# Patient Record
Sex: MALE | Race: WHITE | HISPANIC OR LATINO | Employment: FULL TIME | URBAN - METROPOLITAN AREA
[De-identification: names, ages, dates, MRNs, and addresses within clinical notes are randomized per-mention and may not be internally consistent; named-entity substitution may affect disease eponyms.]

---

## 2017-01-05 ENCOUNTER — HOSPITAL ENCOUNTER (EMERGENCY)
Facility: HOSPITAL | Age: 22
Discharge: HOME | End: 2017-01-05
Attending: EMERGENCY MEDICINE | Admitting: EMERGENCY MEDICINE
Payer: COMMERCIAL

## 2017-01-05 VITALS
HEART RATE: 74 BPM | TEMPERATURE: 98.6 F | SYSTOLIC BLOOD PRESSURE: 141 MMHG | DIASTOLIC BLOOD PRESSURE: 66 MMHG | RESPIRATION RATE: 18 BRPM | OXYGEN SATURATION: 100 % | WEIGHT: 185 LBS

## 2017-01-05 DIAGNOSIS — T14.8XXA MUSCLE STRAIN: ICD-10-CM

## 2017-01-05 DIAGNOSIS — R10.9 FLANK PAIN: Primary | ICD-10-CM

## 2017-01-05 LAB
BILIRUB UR QL STRIP: NEGATIVE
CLARITY UR: CLEAR
CLARITY, POC: CLEAR
COLOR UR: YELLOW
COLOR, POC: YELLOW
GLUCOSE UR STRIP-MCNC: NEGATIVE MG/DL
HGB UR QL STRIP.AUTO: NEGATIVE
KETONES UR STRIP-MCNC: NEGATIVE MG/DL
LEUKOCYTE ESTERASE UR QL STRIP: NEGATIVE
NITRITE UR QL STRIP: NEGATIVE
PH UR STRIP.AUTO: 7 [PH] (ref 4.5–8)
PROT UR STRIP-MCNC: NEGATIVE MG/DL
SP GR UR STRIP.AUTO: 1.02 (ref 1–1.03)
UROBILINOGEN UR QL STRIP.AUTO: 0.2 E.U./DL

## 2017-01-05 PROCEDURE — 99284 EMERGENCY DEPT VISIT MOD MDM: CPT

## 2017-01-05 PROCEDURE — 81002 URINALYSIS NONAUTO W/O SCOPE: CPT | Performed by: EMERGENCY MEDICINE

## 2017-01-05 PROCEDURE — 81003 URINALYSIS AUTO W/O SCOPE: CPT

## 2017-01-05 RX ORDER — LIDOCAINE 50 MG/G
1 PATCH TOPICAL EVERY 24 HOURS
Qty: 30 PATCH | Refills: 0 | Status: SHIPPED | OUTPATIENT
Start: 2017-01-05 | End: 2018-06-20

## 2017-01-05 RX ORDER — TIZANIDINE 4 MG/1
4 TABLET ORAL EVERY 8 HOURS PRN
Status: DISCONTINUED | OUTPATIENT
Start: 2017-01-05 | End: 2017-01-05 | Stop reason: HOSPADM

## 2017-01-05 RX ORDER — TIZANIDINE HYDROCHLORIDE 4 MG/1
4 CAPSULE, GELATIN COATED ORAL 3 TIMES DAILY
Qty: 90 CAPSULE | Refills: 0 | Status: SHIPPED | OUTPATIENT
Start: 2017-01-05 | End: 2018-06-20

## 2017-01-05 RX ORDER — LIDOCAINE 50 MG/G
1 PATCH TOPICAL ONCE
Status: COMPLETED | OUTPATIENT
Start: 2017-01-05 | End: 2017-01-05

## 2017-01-05 RX ORDER — ACETAMINOPHEN 325 MG/1
650 TABLET ORAL ONCE
Status: COMPLETED | OUTPATIENT
Start: 2017-01-05 | End: 2017-01-05

## 2017-01-05 RX ADMIN — LIDOCAINE 1 PATCH: 50 PATCH CUTANEOUS at 18:50

## 2017-01-05 RX ADMIN — ACETAMINOPHEN 650 MG: 325 TABLET, FILM COATED ORAL at 18:49

## 2017-04-02 ENCOUNTER — HOSPITAL ENCOUNTER (EMERGENCY)
Facility: HOSPITAL | Age: 22
Discharge: HOME | End: 2017-04-03
Attending: EMERGENCY MEDICINE | Admitting: EMERGENCY MEDICINE
Payer: COMMERCIAL

## 2017-04-02 VITALS
HEART RATE: 76 BPM | WEIGHT: 175 LBS | OXYGEN SATURATION: 96 % | DIASTOLIC BLOOD PRESSURE: 63 MMHG | SYSTOLIC BLOOD PRESSURE: 131 MMHG | RESPIRATION RATE: 18 BRPM | TEMPERATURE: 98.1 F

## 2017-04-02 DIAGNOSIS — J06.9 VIRAL URI WITH COUGH: Primary | ICD-10-CM

## 2017-04-03 PROCEDURE — 99283 EMERGENCY DEPT VISIT LOW MDM: CPT

## 2018-02-20 ENCOUNTER — HOSPITAL ENCOUNTER (EMERGENCY)
Facility: HOSPITAL | Age: 23
Discharge: HOME | End: 2018-02-20
Attending: EMERGENCY MEDICINE | Admitting: EMERGENCY MEDICINE
Payer: COMMERCIAL

## 2018-02-20 VITALS
DIASTOLIC BLOOD PRESSURE: 64 MMHG | RESPIRATION RATE: 16 BRPM | WEIGHT: 170 LBS | TEMPERATURE: 98.8 F | BODY MASS INDEX: 23.71 KG/M2 | HEART RATE: 68 BPM | SYSTOLIC BLOOD PRESSURE: 123 MMHG | OXYGEN SATURATION: 98 %

## 2018-02-20 DIAGNOSIS — R19.7 NAUSEA VOMITING AND DIARRHEA: Primary | ICD-10-CM

## 2018-02-20 DIAGNOSIS — R11.2 NAUSEA VOMITING AND DIARRHEA: Primary | ICD-10-CM

## 2018-02-20 LAB
ALBUMIN SERPL BCP-MCNC: 4.8 G/DL (ref 3.5–5)
ALP SERPL-CCNC: 73 U/L (ref 46–116)
ALT SERPL W P-5'-P-CCNC: 19 U/L (ref 12–78)
ANION GAP SERPL CALCULATED.3IONS-SCNC: 13 MMOL/L (ref 4–13)
AST SERPL W P-5'-P-CCNC: 16 U/L (ref 5–45)
BACTERIA UR QL AUTO: NORMAL /HPF
BASOPHILS # BLD AUTO: 0.03 THOUSANDS/ΜL (ref 0–0.1)
BASOPHILS NFR BLD AUTO: 0 % (ref 0–1)
BILIRUB SERPL-MCNC: 0.71 MG/DL (ref 0.2–1)
BILIRUB UR QL STRIP: ABNORMAL
BUN SERPL-MCNC: 12 MG/DL (ref 5–25)
CALCIUM SERPL-MCNC: 10.2 MG/DL (ref 8.3–10.1)
CHLORIDE SERPL-SCNC: 104 MMOL/L (ref 100–108)
CLARITY UR: CLEAR
CO2 SERPL-SCNC: 26 MMOL/L (ref 21–32)
COLOR UR: ABNORMAL
COLOR, POC: NORMAL
CREAT SERPL-MCNC: 1.15 MG/DL (ref 0.6–1.3)
EOSINOPHIL # BLD AUTO: 0.02 THOUSAND/ΜL (ref 0–0.61)
EOSINOPHIL NFR BLD AUTO: 0 % (ref 0–6)
ERYTHROCYTE [DISTWIDTH] IN BLOOD BY AUTOMATED COUNT: 12.5 % (ref 11.6–15.1)
GFR SERPL CREATININE-BSD FRML MDRD: 88 ML/MIN/1.73SQ M
GLUCOSE SERPL-MCNC: 99 MG/DL (ref 65–140)
GLUCOSE UR STRIP-MCNC: NEGATIVE MG/DL
HCT VFR BLD AUTO: 46.2 % (ref 36.5–49.3)
HGB BLD-MCNC: 16.6 G/DL (ref 12–17)
HGB UR QL STRIP.AUTO: NEGATIVE
KETONES UR STRIP-MCNC: ABNORMAL MG/DL
LEUKOCYTE ESTERASE UR QL STRIP: NEGATIVE
LIPASE SERPL-CCNC: 90 U/L (ref 73–393)
LYMPHOCYTES # BLD AUTO: 0.9 THOUSANDS/ΜL (ref 0.6–4.47)
LYMPHOCYTES NFR BLD AUTO: 6 % (ref 14–44)
MCH RBC QN AUTO: 29.8 PG (ref 26.8–34.3)
MCHC RBC AUTO-ENTMCNC: 35.9 G/DL (ref 31.4–37.4)
MCV RBC AUTO: 83 FL (ref 82–98)
MONOCYTES # BLD AUTO: 0.76 THOUSAND/ΜL (ref 0.17–1.22)
MONOCYTES NFR BLD AUTO: 5 % (ref 4–12)
MUCOUS THREADS UR QL AUTO: NORMAL
NEUTROPHILS # BLD AUTO: 12.69 THOUSANDS/ΜL (ref 1.85–7.62)
NEUTS SEG NFR BLD AUTO: 89 % (ref 43–75)
NITRITE UR QL STRIP: NEGATIVE
NON-SQ EPI CELLS URNS QL MICRO: NORMAL /HPF
NRBC BLD AUTO-RTO: 0 /100 WBCS
PH UR STRIP.AUTO: 6 [PH] (ref 4.5–8)
PLATELET # BLD AUTO: 265 THOUSANDS/UL (ref 149–390)
PMV BLD AUTO: 10.3 FL (ref 8.9–12.7)
POTASSIUM SERPL-SCNC: 4 MMOL/L (ref 3.5–5.3)
PROT SERPL-MCNC: 8.7 G/DL (ref 6.4–8.2)
PROT UR STRIP-MCNC: ABNORMAL MG/DL
RBC # BLD AUTO: 5.57 MILLION/UL (ref 3.88–5.62)
RBC #/AREA URNS AUTO: NORMAL /HPF
SODIUM SERPL-SCNC: 143 MMOL/L (ref 136–145)
SP GR UR STRIP.AUTO: 1.02 (ref 1–1.03)
UROBILINOGEN UR QL STRIP.AUTO: 0.2 E.U./DL
WBC # BLD AUTO: 14.4 THOUSAND/UL (ref 4.31–10.16)
WBC #/AREA URNS AUTO: NORMAL /HPF

## 2018-02-20 PROCEDURE — 96374 THER/PROPH/DIAG INJ IV PUSH: CPT

## 2018-02-20 PROCEDURE — 83690 ASSAY OF LIPASE: CPT | Performed by: PHYSICIAN ASSISTANT

## 2018-02-20 PROCEDURE — 81001 URINALYSIS AUTO W/SCOPE: CPT

## 2018-02-20 PROCEDURE — 81002 URINALYSIS NONAUTO W/O SCOPE: CPT | Performed by: PHYSICIAN ASSISTANT

## 2018-02-20 PROCEDURE — 99283 EMERGENCY DEPT VISIT LOW MDM: CPT

## 2018-02-20 PROCEDURE — 96361 HYDRATE IV INFUSION ADD-ON: CPT

## 2018-02-20 PROCEDURE — 36415 COLL VENOUS BLD VENIPUNCTURE: CPT | Performed by: PHYSICIAN ASSISTANT

## 2018-02-20 PROCEDURE — 80053 COMPREHEN METABOLIC PANEL: CPT | Performed by: PHYSICIAN ASSISTANT

## 2018-02-20 PROCEDURE — 85025 COMPLETE CBC W/AUTO DIFF WBC: CPT | Performed by: PHYSICIAN ASSISTANT

## 2018-02-20 RX ORDER — DICYCLOMINE HCL 20 MG
20 TABLET ORAL ONCE
Status: COMPLETED | OUTPATIENT
Start: 2018-02-20 | End: 2018-02-20

## 2018-02-20 RX ORDER — ONDANSETRON 4 MG/1
4 TABLET, FILM COATED ORAL EVERY 8 HOURS PRN
Qty: 15 TABLET | Refills: 0 | Status: SHIPPED | OUTPATIENT
Start: 2018-02-20 | End: 2018-06-20

## 2018-02-20 RX ORDER — ONDANSETRON 2 MG/ML
4 INJECTION INTRAMUSCULAR; INTRAVENOUS ONCE
Status: COMPLETED | OUTPATIENT
Start: 2018-02-20 | End: 2018-02-20

## 2018-02-20 RX ORDER — DICYCLOMINE HCL 20 MG
20 TABLET ORAL EVERY 6 HOURS
Qty: 20 TABLET | Refills: 0 | Status: SHIPPED | OUTPATIENT
Start: 2018-02-20 | End: 2018-06-20

## 2018-02-20 RX ADMIN — ONDANSETRON 4 MG: 2 INJECTION INTRAMUSCULAR; INTRAVENOUS at 14:21

## 2018-02-20 RX ADMIN — DICYCLOMINE HYDROCHLORIDE 20 MG: 20 TABLET ORAL at 14:21

## 2018-02-20 RX ADMIN — SODIUM CHLORIDE 1000 ML: 0.9 INJECTION, SOLUTION INTRAVENOUS at 14:20

## 2018-02-20 NOTE — ED PROVIDER NOTES
History  Chief Complaint   Patient presents with    Vomiting     Vomiting and diarrhea since yesterday  19 yo male with pmh of asthma and seizures, presents for evaluation of nausea, vomiting, diarrhea and generalized upper abdominal pain for the past 2 days  Associated sxs include decreased appetite and chills  Is around his sick contacts, his brother with similar symptoms  No recent travel history  No new foods  Tried nothing for it  Denies fever, cough, nasal congestion, body aches, ear pain, cp, sob, difficulty breathing, constipation, hematochezia or melena  Prior to Admission Medications   Prescriptions Last Dose Informant Patient Reported? Taking? Phenytoin (DILANTIN PO)   Yes No   Sig: Take by mouth daily   TiZANidine (ZANAFLEX) 4 MG capsule   No No   Sig: Take 1 capsule by mouth 3 (three) times a day for 30 days   lidocaine (LIDODERM) 5 %   No No   Sig: Place 1 patch on the skin every 24 hours for 30 days Remove & Discard patch within 12 hours or as directed by MD      Facility-Administered Medications: None       Past Medical History:   Diagnosis Date    Asthma     Seizures ([facility] [address] )        Past Surgical History:   Procedure Laterality Date    KIDNEY SURGERY      NEPHRECTOMY Left     Stopped functioning       History reviewed  No pertinent family history  I have reviewed and agree with the history as documented  Social History   Substance Use Topics    Smoking status: Current Every Day Smoker     Packs/day: 0 20    Smokeless tobacco: Never Used    Alcohol use Yes      Comment: ocass  Review of Systems  Negative :  Chills, fever, congestion, sore throat, cough, dysuria, flank pain, frequency, urgency, myalgias  Negative for skin     GI:  Positive for abdominal pain, diarrhea, nausea, vomiting      Physical Exam  ED Triage Vitals [02/20/18 1218]   Temperature Pulse Respirations Blood Pressure SpO2   98 8 °F (37 1 °C) 68 16 120/60 100 %      Temp Source Heart Rate Source Patient Position - Orthostatic VS BP Location FiO2 (%)   Oral Monitor Lying Right arm --      Pain Score       5           Orthostatic Vital Signs  Vitals:    02/20/18 1218 02/20/18 1534   BP: 120/60 123/64   Pulse: 68 68   Patient Position - Orthostatic VS: Lying Lying       Physical Exam   Constitutional: He is oriented to person, place, and time  He appears well-developed and well-nourished  He is cooperative  No distress  HENT:   Head: Normocephalic and atraumatic  Eyes: Conjunctivae are normal    Neck: Normal range of motion  Neck supple  Cardiovascular: Normal rate and normal heart sounds  No murmur heard  Pulmonary/Chest: Effort normal and breath sounds normal    Abdominal: Soft  Normal appearance and bowel sounds are normal  There is tenderness in the periumbilical area and left upper quadrant  There is no rigidity, no rebound, no guarding, no CVA tenderness, no tenderness at McBurney's point and negative Michele's sign  Musculoskeletal: Normal range of motion  Neurological: He is alert and oriented to person, place, and time  Skin: Skin is warm  No rash noted  He is not diaphoretic  No erythema  Psychiatric: He has a normal mood and affect           ED Medications  Medications   sodium chloride 0 9 % bolus 1,000 mL (0 mL Intravenous Stopped 2/20/18 1512)   ondansetron (ZOFRAN) injection 4 mg (4 mg Intravenous Given 2/20/18 1421)   dicyclomine (BENTYL) tablet 20 mg (20 mg Oral Given 2/20/18 1421)       Diagnostic Studies  Results Reviewed     Procedure Component Value Units Date/Time    Urine Microscopic [[id number]]  (Normal) Collected:  02/20/18 1517    Lab Status:  Final result Specimen:  Urine from Urine, Clean Catch Updated:  02/20/18 1606     RBC, UA None Seen /hpf      WBC, UA None Seen /hpf      Epithelial Cells None Seen /hpf      Bacteria, UA Occasional /hpf      MUCOUS THREADS Occasional    POCT urinalysis dipstick [[id number]]  (Normal) Resulted:  02/20/18 1515    Lab Status: Final result Specimen:  Urine Updated:  02/20/18 1518     Color, UA Rochelle    ED Urine Macroscopic [[id number]]  (Abnormal) Collected:  02/20/18 1517    Lab Status:  Final result Specimen:  Urine Updated:  02/20/18 1516     Color, UA Rochelle     Clarity, UA Clear     pH, UA 6 0     Leukocytes, UA Negative     Nitrite, UA Negative     Protein,  (2+) (A) mg/dl      Glucose, UA Negative mg/dl      Ketones, UA >=160 (4+) (A) mg/dl      Urobilinogen, UA 0 2 E U /dl      Bilirubin, UA Interference- unable to analyze (A)     Blood, UA Negative     Specific Gravity, UA 1 025    Narrative:       CLINITEK RESULT    Comprehensive metabolic panel [[id number]]  (Abnormal) Collected:  02/20/18 1415    Lab Status:  Final result Specimen:  Blood from Arm, Left Updated:  02/20/18 1438     Sodium 143 mmol/L      Potassium 4 0 mmol/L      Chloride 104 mmol/L      CO2 26 mmol/L      Anion Gap 13 mmol/L      BUN 12 mg/dL      Creatinine 1 15 mg/dL      Glucose 99 mg/dL      Calcium 10 2 (H) mg/dL      AST 16 U/L      ALT 19 U/L      Alkaline Phosphatase 73 U/L      Total Protein 8 7 (H) g/dL      Albumin 4 8 g/dL      Total Bilirubin 0 71 mg/dL      eGFR 88 ml/min/1 73sq m     Narrative:         National Kidney Disease Education Program recommendations are as follows:  GFR calculation is accurate only with a steady state creatinine  Chronic Kidney disease less than 60 ml/min/1 73 sq  meters  Kidney failure less than 15 ml/min/1 73 sq  meters      Lipase [[id number]]  (Normal) Collected:  02/20/18 1415    Lab Status:  Final result Specimen:  Blood from Arm, Left Updated:  02/20/18 1438     Lipase 90 u/L     CBC and differential [[id number]]  (Abnormal) Collected:  02/20/18 1415    Lab Status:  Final result Specimen:  Blood from Arm, Left Updated:  02/20/18 1432     WBC 14 40 (H) Thousand/uL      RBC 5 57 Million/uL      Hemoglobin 16 6 g/dL      Hematocrit 46 2 %      MCV 83 fL      MCH 29 8 pg      MCHC 35 9 g/dL      RDW 12 5 %      MPV 10 3 fL      Platelets 414 Thousands/uL      nRBC 0 /100 WBCs      Neutrophils Relative 89 (H) %      Lymphocytes Relative 6 (L) %      Monocytes Relative 5 %      Eosinophils Relative 0 %      Basophils Relative 0 %      Neutrophils Absolute 12 69 (H) Thousands/µL      Lymphocytes Absolute 0 90 Thousands/µL      Monocytes Absolute 0 76 Thousand/µL      Eosinophils Absolute 0 02 Thousand/µL      Basophils Absolute 0 03 Thousands/µL                  No orders to display              Procedures  Procedures       Phone Contacts  ED Phone Contact    ED Course  ED Course                                MDM  Number of Diagnoses or Management Options  Nausea vomiting and diarrhea:   Diagnosis management comments: Well-appearing 57-year-old male presents for evaluation nausea, vomiting as well as diarrhea for the past 2 days  Patient is well-appearing, vital signs not concerning  After doses Zofran and Bentyl, reports improvement in symptoms  Will advise to continue to follow up with family care provider  Return precautions given if symptoms worsen  Patient demonstrates understanding and agrees to plan  Differential diagnosis includes was not limited to, gastroenteritis, food poisoning, IBS, other    CritCare Time    Disposition  Final diagnoses:   Nausea vomiting and diarrhea     Time reflects when diagnosis was documented in both MDM as applicable and the Disposition within this note     Time User Action Codes Description Comment    2/20/2018  3:27 PM Amrit Pavon Add [R11 2,  R19 7] Nausea vomiting and diarrhea       ED Disposition     ED Disposition Condition Comment    Discharge  [facility] discharge to home/self care  Condition at discharge: Good        Follow-up Information     Follow up With Specialties Details Why Contact Info Additional Amparo Mattson MD [facility] Medicine Schedule an appointment as soon as possible for a visit in 3 days Follow up for recheck of symptoms    [facility] [facility] Emergency Department Emergency Medicine  If symptoms worsen, such as if abdominal pain localizes over the right lower abdomen  [address] [facility], [address], [place], [address]        Discharge Medication List as of 2/20/2018  3:29 PM      START taking these medications    Details   dicyclomine (BENTYL) 20 mg tablet Take 1 tablet (20 mg total) by mouth every 6 (six) hours for 5 days, Starting Tue 2/20/2018, Until Sun 2/25/2018, Print      ondansetron (ZOFRAN) 4 mg tablet Take 1 tablet (4 mg total) by mouth every 8 (eight) hours as needed for nausea or vomiting for up to 5 days, Starting Tue 2/20/2018, Until Sun 2/25/2018, Print         CONTINUE these medications which have NOT CHANGED    Details   lidocaine (LIDODERM) 5 % Place 1 patch on the skin every 24 hours for 30 days Remove & Discard patch within 12 hours or as directed by MD, Starting 1/5/2017, Until Sat 2/4/17, Print      Phenytoin (DILANTIN PO) Take by mouth daily, Until Discontinued, Historical Med      TiZANidine (ZANAFLEX) 4 MG capsule Take 1 capsule by mouth 3 (three) times a day for 30 days, Starting 1/5/2017, Until Sat 2/4/17, Print           No discharge procedures on file      ED Provider  Electronically Signed by           Lesley Luna PA-C  02/24/18 5562

## 2018-02-20 NOTE — DISCHARGE INSTRUCTIONS
- Rest, drink plenty of fluids  Acute Nausea and Vomiting, Ambulatory Care   GENERAL INFORMATION:   Acute nausea and vomiting  starts suddenly, gets worse quickly, and lasts a short time  Nausea and vomiting may be caused by pregnancy, alcohol, infection, or medicines  Common related symptoms include the following:   · Fever    · Abdominal swelling    · Pain, tenderness, or a lump in the abdomen    · Splashing sounds heard in your stomach when you move  Seek immediate care for the following symptoms:   · Blood in your vomit or bowel movements    · Sudden, severe pain in your chest and upper abdomen after hard vomiting    · Dizziness, dry mouth, and thirst    · Urinating very little or not at all    · Muscle weakness, leg cramps, and trouble breathing    · A heart beat that is faster than normal    · Vomiting for more than 48 hours  Treatment for acute nausea and vomiting  may include medicines to calm your stomach and stop the vomiting  You may need IV fluids if you are dehydrated  Manage your nausea and vomiting:   · Drink liquids as directed to prevent dehydration  Ask how much liquid to drink each day and which liquids are best for you  You may need to drink an oral rehydration solution (ORS)  ORS contains water, salts, and sugar that are needed to replace the lost body fluids  Ask what kind of ORS to use, how much to drink, and where to get it  · Eat smaller meals, more often  Eat small amounts of food every 2 to 3 hours, even if you are not hungry  Food in your stomach may help decrease your nausea  · Avoid stress  Find ways to relax and manage your stress  Headaches due to stress may cause nausea and vomiting  Get more rest and sleep  Follow up with your healthcare provider as directed:  Write down your questions so you remember to ask them during your visits  CARE AGREEMENT:   You have the right to help plan your care  Learn about your health condition and how it may be treated   Discuss treatment options with your caregivers to decide what care you want to receive  You always have the right to refuse treatment  The above information is an  only  It is not intended as medical advice for individual conditions or treatments  Talk to your doctor, nurse or pharmacist before following any medical regimen to see if it is safe and effective for you  © 2014 [address] is for End User's use only and may not be sold, redistributed or otherwise used for commercial purposes  All illustrations and images included in CareNotes® are the copyrighted property of [employer] , [facility]  or Carlin Yuen  Acute Diarrhea   WHAT YOU NEED TO KNOW:   What is acute diarrhea? Acute diarrhea starts quickly and lasts a short time, usually 1 to 3 days  It can last up to 2 weeks  What causes acute diarrhea? · Bacteria, such as E coli or salmonella    · Viruses, such as rotavirus and norovirus    · A parasite, such as giardia    · Medicines, such as laxatives, antacids, or antibiotics    · An allergy to lactose, soy, or gluten    · Eating food or drinking water that contains germs    · Medical treatments, such as chemotherapy or radiation  What other signs and symptoms might I have with acute diarrhea? You may have 3 or more episodes of diarrhea  It may be hard to control your diarrhea  You may also have any of the following:  · Fever and chills    · Headache or abdominal pain    · Nausea and vomiting    · Symptoms of dehydration such as thirst, decreased urination, dry skin, sunken eyes, or fast, pounding heartbeat  What does my healthcare provider need to know about my acute diarrhea? Your healthcare provider will ask about your symptoms  He or she will ask what you have recently eaten and if you have traveled to other countries  Tell the provider what medicines you use or if you have been around anyone who is sick   Your healthcare provider may check you for signs of dehydration  How is acute diarrhea treated? Acute diarrhea usually gets better without treatment  You may need any of the following if your diarrhea is severe or lasts longer than a few days:  · Diarrhea medicine  is an over-the-counter medicine that helps slow or stop your diarrhea  · Antibiotics  may be given to help treat an infection caused by bacteria  · Parasite medicine  may be given to treat an infection caused by parasites  How can acute diarrhea be managed? · Drink liquids as directed  Liquids will help prevent dehydration caused by diarrhea  Ask your healthcare provider how much liquid to drink each day and which liquids are best for you  You may need to drink an oral rehydration solution (ORS)  An ORS has the right amounts of water, salts, and sugar you need to replace body fluids  You can buy an ORS at most grocery stores and pharmacies  · Eat foods that are easy to digest   Examples include rice, lentils, cereal, bananas, potatoes, and bread  It also includes some fruits (bananas, melon), well-cooked vegetables, and lean meats  Avoid foods high in fiber, fat, and sugar  Also avoid caffeine, alcohol, dairy, and red meat until your diarrhea is gone  How can acute diarrhea be prevented? · Wash your hands often  Use soap and water  Wash your hands before you eat or prepare food  Also wash your hands after you use the bathroom  Use an alcohol-based hand gel when soap and water are not available  · Keep bathroom surfaces clean  This helps prevent the spread of germs that cause acute diarrhea  · Wash fruits and vegetables well before you eat them  This can help remove germs that cause diarrhea  If possible, remove the skin from fruits and vegetables, or cook them well before you eat them  · Cook meat as directed  ¨ Cook ground meat  to 160°F      ¨ Cook ground poultry, whole poultry, or cuts of poultry  to at least 165°F  Remove the meat from heat   Let it stand for 3 minutes before you eat it  ¨ Cook whole cuts of meat other than poultry  to at least 145°F  Remove the meat from heat  Let it stand for 3 minutes before you eat it  · Wash dishes that have touched raw meat with hot water and soap  This includes cutting boards, utensils, dishes, and serving containers  · Place raw or cooked meat in the refrigerator as soon as possible  Bacteria can grow in meat that is left at room temperature too long  · Do not eat raw or undercooked oysters, clams, or mussels  These foods may be contaminated and cause infection  · Drink filtered or treated water only when you travel  Do not put ice in your drinks  Drink bottled water whenever possible  When should I seek immediate care? · You feel confused  · Your heartbeat is faster than normal      · Your eyes look deeply sunken, or you have no tears when you cry  · You urinate less than usual, or your urine is dark yellow  · You have blood or mucus in your stools  · You have severe abdominal pain  · You are unable to drink any liquids  When should I contact my healthcare provider? · Your symptoms do not get better with treatment  · You have a fever higher than 101 3°F (38 5°C)  · You have trouble eating and drinking because you are vomiting  · You are thirsty or have a dry mouth  · Your diarrhea does not get better in 7 days  · You have questions or concerns about your condition or care  CARE AGREEMENT:   You have the right to help plan your care  Learn about your health condition and how it may be treated  Discuss treatment options with your caregivers to decide what care you want to receive  You always have the right to refuse treatment  The above information is an  only  It is not intended as medical advice for individual conditions or treatments   Talk to your doctor, nurse or pharmacist before following any medical regimen to see if it is safe and effective for you  © 2017 [address] [facility] Information is for End User's use only and may not be sold, redistributed or otherwise used for commercial purposes  All illustrations and images included in CareNotes® are the copyrighted property of [employer]  or Carlin Yuen

## 2018-06-09 LAB
ABSOL LYMPHOCYTES (HISTORICAL): 2.5 K/UL (ref 0.5–4)
ALBUMIN SERPL BCP-MCNC: 4.4 G/DL (ref 3–5.2)
ALP SERPL-CCNC: 52 U/L (ref 43–122)
ALT SERPL W P-5'-P-CCNC: 25 U/L (ref 9–52)
ANION GAP SERPL CALCULATED.3IONS-SCNC: 12 MMOL/L (ref 5–14)
AST SERPL W P-5'-P-CCNC: 24 U/L (ref 17–59)
BASOPHILS # BLD AUTO: 0.1 K/UL (ref 0–0.1)
BASOPHILS # BLD AUTO: 1 % (ref 0–1)
BILIRUB SERPL-MCNC: 0.3 MG/DL
BUN SERPL-MCNC: 12 MG/DL (ref 5–25)
CALCIUM SERPL-MCNC: 9.8 MG/DL (ref 8.4–10.2)
CHLORIDE SERPL-SCNC: 103 MEQ/L (ref 97–108)
CK SERPL-CCNC: 111 U/L (ref 55–170)
CO2 SERPL-SCNC: 26 MMOL/L (ref 22–30)
CREATINE, SERUM (HISTORICAL): 0.83 MG/DL (ref 0.7–1.5)
DEPRECATED RDW RBC AUTO: 13.3 %
EGFR (HISTORICAL): >60 ML/MIN/1.73 M2
EOSINOPHIL # BLD AUTO: 0.3 K/UL (ref 0–0.4)
EOSINOPHIL NFR BLD AUTO: 5 % (ref 0–6)
GLUCOSE SERPL-MCNC: 84 MG/DL (ref 70–99)
GLUCOSE SERPL-MCNC: 96 MG/DL (ref 70–99)
HCT VFR BLD AUTO: 44.3 % (ref 41–53)
HGB BLD-MCNC: 15.1 G/DL (ref 13.5–17.5)
LYMPHOCYTES NFR BLD AUTO: 36 % (ref 25–45)
MCH RBC QN AUTO: 29.4 PG (ref 26–34)
MCHC RBC AUTO-ENTMCNC: 34.1 % (ref 31–36)
MCV RBC AUTO: 86 FL (ref 80–100)
MONOCYTES # BLD AUTO: 0.7 K/UL (ref 0.2–0.9)
MONOCYTES NFR BLD AUTO: 10 % (ref 1–10)
NEUTROPHILS ABS COUNT (HISTORICAL): 3.4 K/UL (ref 1.8–7.8)
NEUTS SEG NFR BLD AUTO: 48 % (ref 45–65)
PLATELET # BLD AUTO: 296 K/MCL (ref 150–450)
POTASSIUM SERPL-SCNC: 4.5 MEQ/L (ref 3.6–5)
RBC # BLD AUTO: 5.13 M/MCL (ref 4.5–5.9)
SODIUM SERPL-SCNC: 141 MEQ/L (ref 137–147)
TOTAL PROTEIN (HISTORICAL): 7.4 G/DL (ref 5.9–8.4)
WBC # BLD AUTO: 7 K/MCL (ref 4.5–11)

## 2018-06-20 ENCOUNTER — HOSPITAL ENCOUNTER (EMERGENCY)
Facility: HOSPITAL | Age: 23
Discharge: HOME | End: 2018-06-20
Attending: EMERGENCY MEDICINE
Payer: COMMERCIAL

## 2018-06-20 VITALS
OXYGEN SATURATION: 98 % | RESPIRATION RATE: 16 BRPM | TEMPERATURE: 98.5 F | DIASTOLIC BLOOD PRESSURE: 74 MMHG | HEART RATE: 71 BPM | SYSTOLIC BLOOD PRESSURE: 124 MMHG | BODY MASS INDEX: 24.41 KG/M2 | WEIGHT: 175 LBS

## 2018-06-20 DIAGNOSIS — R56.9 SEIZURE SECONDARY TO SUBTHERAPEUTIC ANTICONVULSANT MEDICATION (HCC): Primary | ICD-10-CM

## 2018-06-20 DIAGNOSIS — Z79.899 SEIZURE SECONDARY TO SUBTHERAPEUTIC ANTICONVULSANT MEDICATION (HCC): Primary | ICD-10-CM

## 2018-06-20 LAB
ALBUMIN SERPL BCP-MCNC: 4 G/DL (ref 3.5–5)
ALP SERPL-CCNC: 53 U/L (ref 46–116)
ALT SERPL W P-5'-P-CCNC: 50 U/L (ref 12–78)
ANION GAP SERPL CALCULATED.3IONS-SCNC: 8 MMOL/L (ref 4–13)
AST SERPL W P-5'-P-CCNC: 27 U/L (ref 5–45)
BILIRUB SERPL-MCNC: 0.4 MG/DL (ref 0.2–1)
BILIRUB UR QL STRIP: NEGATIVE
BUN SERPL-MCNC: 8 MG/DL (ref 5–25)
CALCIUM SERPL-MCNC: 9 MG/DL (ref 8.3–10.1)
CHLORIDE SERPL-SCNC: 104 MMOL/L (ref 100–108)
CLARITY UR: CLEAR
CLARITY, POC: NORMAL
CO2 SERPL-SCNC: 28 MMOL/L (ref 21–32)
COLOR UR: YELLOW
COLOR, POC: NORMAL
CREAT SERPL-MCNC: 1.07 MG/DL (ref 0.6–1.3)
GFR SERPL CREATININE-BSD FRML MDRD: 96 ML/MIN/1.73SQ M
GLUCOSE SERPL-MCNC: 89 MG/DL (ref 65–140)
GLUCOSE UR STRIP-MCNC: NEGATIVE MG/DL
HGB UR QL STRIP.AUTO: NEGATIVE
KETONES UR STRIP-MCNC: NEGATIVE MG/DL
LEUKOCYTE ESTERASE UR QL STRIP: NEGATIVE
NITRITE UR QL STRIP: NEGATIVE
PH UR STRIP.AUTO: 6.5 [PH] (ref 4.5–8)
POTASSIUM SERPL-SCNC: 3.9 MMOL/L (ref 3.5–5.3)
PROT SERPL-MCNC: 7.5 G/DL (ref 6.4–8.2)
PROT UR STRIP-MCNC: NEGATIVE MG/DL
SODIUM SERPL-SCNC: 140 MMOL/L (ref 136–145)
SP GR UR STRIP.AUTO: 1.01 (ref 1–1.03)
UROBILINOGEN UR QL STRIP.AUTO: 0.2 E.U./DL

## 2018-06-20 PROCEDURE — 36415 COLL VENOUS BLD VENIPUNCTURE: CPT | Performed by: EMERGENCY MEDICINE

## 2018-06-20 PROCEDURE — 80053 COMPREHEN METABOLIC PANEL: CPT | Performed by: EMERGENCY MEDICINE

## 2018-06-20 PROCEDURE — 99284 EMERGENCY DEPT VISIT MOD MDM: CPT

## 2018-06-20 PROCEDURE — 96375 TX/PRO/DX INJ NEW DRUG ADDON: CPT

## 2018-06-20 PROCEDURE — 81003 URINALYSIS AUTO W/O SCOPE: CPT

## 2018-06-20 PROCEDURE — 96374 THER/PROPH/DIAG INJ IV PUSH: CPT

## 2018-06-20 RX ORDER — LORAZEPAM 2 MG/ML
1 INJECTION INTRAMUSCULAR ONCE
Status: COMPLETED | OUTPATIENT
Start: 2018-06-20 | End: 2018-06-20

## 2018-06-20 RX ORDER — LEVETIRACETAM 500 MG/1
500 TABLET ORAL 2 TIMES DAILY
COMMUNITY
Start: 2018-01-19 | End: 2018-06-20

## 2018-06-20 RX ORDER — LEVETIRACETAM 500 MG/1
500 TABLET ORAL 2 TIMES DAILY
Qty: 60 TABLET | Refills: 2 | Status: SHIPPED | OUTPATIENT
Start: 2018-06-20 | End: 2018-07-20

## 2018-06-20 RX ADMIN — LEVETIRACETAM 1000 MG: 100 INJECTION, SOLUTION INTRAVENOUS at 18:23

## 2018-06-20 RX ADMIN — LORAZEPAM 1 MG: 2 INJECTION INTRAMUSCULAR; INTRAVENOUS at 16:53

## 2018-06-20 NOTE — PROGRESS NOTES
Progress Note - Neurology   Tr Hartmann 20 y o  male MRN: [id number]  Unit/Bed#: [facility] Encounter: [id number]    Assessment/Plan:  64-year-old man with a known history of epilepsy that used to follow with [facility] neurology  Was well controlled on Dilantin in the past,  Reports about a full year of seizure freedom off AEDs  In January suffered another seizure, was brought to the ER where he was started on Keppra 500 mg b i d , he subsequently ran out  Had another seizure about 3 weeks ago and was again given a 10 day supply of Keppra 500 mg b i d  by [facility]  Again patient ran out, did not attempt to for refill  Again had another seizure, presenting to the ED  Recommend loading with Keppra 1 g, and given patient a script for 500 mg b i d  1 month supply  Provided office telephone number to ED physician, recommend patient call to arrange follow-up appointment    As long as patient is back to baseline remained seizure free, okay to discharge from neurologic standpoint   - please call questions

## 2018-06-20 NOTE — ED PROVIDER NOTES
History  Chief Complaint   Patient presents with    Seizure - Prior Hx Of     Patient reports his seizure medication was changed to keppra from dilantin, reports having increased seizures with the change in medication, was switched due to the fact that the patient did not have insurance and was paying out of pocket for medication and keppra was cheaper  Patient had seizure at 11am this morning and feels like he is going to have another one      19 yo male with onset of seizure at age 23, controlled on dilantin for the first several years, until he was noncompliant for fincancial reasons for at least 1 year prior, with occasional breakthrough seizure, evaluated at [address] [place] [address] 321 1/18/18 and given 10 day supply of Keppra 500 mg bid, which he never had refilled, but was seizure free in the interim until 3 weeks ago, when he had another seizure, sustained a laceration on his chin from the fall, and had his Keppra refilled for 10 days  He now hasn't taken it in about 5 days, and had seizure today witnessed by his Mom in his sleep, about 10am           History provided by:  Patient  Seizure - Prior Hx Of   Seizure activity on arrival: no    Seizure type:  Grand mal  Initial focality:  Diffuse  Episode characteristics: generalized shaking    Postictal symptoms: confusion, memory loss and somnolence    Return to baseline: yes    Duration:  5 minutes  Timing:  Once  Number of seizures this episode:  1  Context: medical compliance    PTA treatment:  None  History of seizures: yes        Prior to Admission Medications   Prescriptions Last Dose Informant Patient Reported?  Taking?   levETIRAcetam (KEPPRA) 500 mg tablet   Yes Yes   Sig: Take 500 mg by mouth 2 (two) times a day      Facility-Administered Medications: None       Past Medical History:   Diagnosis Date    Asthma     Seizures ([facility] [address] )        Past Surgical History:   Procedure Laterality Date    KIDNEY SURGERY      NEPHRECTOMY Left     Stopped functioning       History reviewed  No pertinent family history  I have reviewed and agree with the history as documented  Social History   Substance Use Topics    Smoking status: Current Every Day Smoker     Packs/day: 0 20    Smokeless tobacco: Never Used    Alcohol use Yes      Comment: ocass  Review of Systems   Constitutional: Negative for appetite change, chills and fever  HENT: Negative for sore throat  Respiratory: Negative for cough, shortness of breath and wheezing  Cardiovascular: Negative for chest pain and palpitations  Gastrointestinal: Negative for abdominal pain, diarrhea, nausea and vomiting  Genitourinary: Negative for dysuria and hematuria  Musculoskeletal: Negative for neck pain  Skin: Negative for rash  Neurological: Negative for dizziness, weakness and headaches  Psychiatric/Behavioral: Negative for suicidal ideas  All other systems reviewed and are negative  Physical Exam  Physical Exam   Constitutional: He is oriented to person, place, and time  He appears well-developed and well-nourished  Non-toxic appearance  HENT:   Head: Normocephalic  Right Ear: Tympanic membrane and external ear normal    Left Ear: External ear normal    Nose: Nose normal    Mouth/Throat: Oropharynx is clear and moist    Eyes: Conjunctivae, EOM and lids are normal  Pupils are equal, round, and reactive to light  Neck: Normal range of motion  Neck supple  No JVD present  No Brudzinski's sign and no Kernig's sign noted  Cardiovascular: Normal rate, regular rhythm and normal heart sounds  No murmur heard  Pulmonary/Chest: Effort normal and breath sounds normal  No accessory muscle usage  No tachypnea  No respiratory distress  He has no wheezes  Abdominal: Soft  Normal appearance and bowel sounds are normal  He exhibits no distension and no mass  There is no tenderness  There is no rigidity, no rebound and no guarding  Musculoskeletal: Normal range of motion     Lymphadenopathy: Head (right side): No submental, no submandibular, no preauricular and no posterior auricular adenopathy present  Head (left side): No submental, no submandibular, no preauricular and no posterior auricular adenopathy present  He has no cervical adenopathy  Neurological: He is alert and oriented to person, place, and time  He has normal strength and normal reflexes  No cranial nerve deficit or sensory deficit  Coordination normal  GCS eye subscore is 4  GCS verbal subscore is 5  GCS motor subscore is 6  Skin: Skin is warm and dry  No rash noted  He is not diaphoretic  Psychiatric: He has a normal mood and affect  His speech is normal and behavior is normal  Thought content normal  Cognition and memory are normal    Nursing note and vitals reviewed        Vital Signs  ED Triage Vitals [06/20/18 1524]   Temperature Pulse Respirations Blood Pressure SpO2   98 °F (36 7 °C) 70 16 137/79 98 %      Temp Source Heart Rate Source Patient Position - Orthostatic VS BP Location FiO2 (%)   Oral Monitor Sitting Right arm --      Pain Score       8           Vitals:    06/20/18 1524 06/20/18 1619 06/20/18 1902   BP: 137/79 135/75 124/74   Pulse: 70 72 71   Patient Position - Orthostatic VS: Sitting Lying Sitting       Visual Acuity  Visual Acuity      Most Recent Value   L Pupil Size (mm)  3   R Pupil Size (mm)  3          ED Medications  Medications   LORazepam (ATIVAN) 2 mg/mL injection 1 mg (1 mg Intravenous Given 6/20/18 1653)   levETIRAcetam (KEPPRA) 1,000 mg in sodium chloride 0 9 % 100 mL IVPB (0 mg Intravenous Stopped 6/20/18 1838)       Diagnostic Studies  Results Reviewed     Procedure Component Value Units Date/Time    POCT urinalysis dipstick [[id number]]  (Normal) Resulted:  06/20/18 1917    Lab Status:  Final result Specimen:  Urine Updated:  06/20/18 1917     Color, UA y     Clarity, UA c    ED Urine Macroscopic [[id number]] Collected:  06/20/18 1916    Lab Status:  Final result Specimen:  Urine Updated: 06/20/18 1912     Color, UA Yellow     Clarity, UA Clear     pH, UA 6 5     Leukocytes, UA Negative     Nitrite, UA Negative     Protein, UA Negative mg/dl      Glucose, UA Negative mg/dl      Ketones, UA Negative mg/dl      Urobilinogen, UA 0 2 E U /dl      Bilirubin, UA Negative     Blood, UA Negative     Specific Gravity, UA 1 015    Narrative:       CLINITEK RESULT    Comprehensive metabolic panel [[id number]] Collected:  06/20/18 1655    Lab Status:  Final result Specimen:  Blood from Arm, Right Updated:  06/20/18 0731     Sodium 140 mmol/L      Potassium 3 9 mmol/L      Chloride 104 mmol/L      CO2 28 mmol/L      Anion Gap 8 mmol/L      BUN 8 mg/dL      Creatinine 1 07 mg/dL      Glucose 89 mg/dL      Calcium 9 0 mg/dL      AST 27 U/L      ALT 50 U/L      Alkaline Phosphatase 53 U/L      Total Protein 7 5 g/dL      Albumin 4 0 g/dL      Total Bilirubin 0 40 mg/dL      eGFR 96 ml/min/1 73sq m     Narrative:         National Kidney Disease Education Program recommendations are as follows:  GFR calculation is accurate only with a steady state creatinine  Chronic Kidney disease less than 60 ml/min/1 73 sq  meters  Kidney failure less than 15 ml/min/1 73 sq  meters  No orders to display              Procedures  CriticalCare Time  Performed by: Rachelle Dash  Authorized by: Rachelle Dash     Critical care provider statement:     Critical care time (minutes):  30    Critical care time was exclusive of:  Separately billable procedures and treating other patients and teaching time    Critical care was necessary to treat or prevent imminent or life-threatening deterioration of the following conditions: seizure management      Critical care was time spent personally by me on the following activities:  Blood draw for specimens, obtaining history from patient or surrogate, development of treatment plan with patient or surrogate, discussions with consultants, evaluation of patient's response to treatment, examination of patient, interpretation of cardiac output measurements, ordering and performing treatments and interventions, ordering and review of laboratory studies, ordering and review of radiographic studies, re-evaluation of patient's condition and review of old charts    I assumed direction of critical care for this patient from another provider in my specialty: no             Phone Contacts  ED Phone Contact    ED Course  ED Course as of Jun 20 2254 Wed Jun 20, 2018   1722 D/W Dr Vahid Arguelles who agrees with loading dose of keppra 1000mg IV, then 500mg bid, and to follow up with Neurology                                MDM  CritCare Time    Disposition  Final diagnoses:   Seizure secondary to subtherapeutic anticonvulsant medication ([address] )     Time reflects when diagnosis was documented in both MDM as applicable and the Disposition within this note     Time User Action Codes Description Comment    6/20/2018  7:04 PM Freddie Torres Add [R56 9,  [id number]] Seizure secondary to subtherapeutic anticonvulsant medication [facility])       ED Disposition     ED Disposition Condition Comment    Discharge  ADAMA DIAS [facility] discharge to home/self care  Condition at discharge: Good        Follow-up Information     Follow up With Specialties Details Why Contact Info    [facility]  Call For followup [phone]          Discharge Medication List as of 6/20/2018  7:10 PM      CONTINUE these medications which have CHANGED    Details   levETIRAcetam (KEPPRA) 500 mg tablet Take 1 tablet (500 mg total) by mouth 2 (two) times a day for 30 days, Starting Wed 6/20/2018, Until Fri 7/20/2018, Print           No discharge procedures on file      ED Provider  Electronically Signed by           Brittney Valdez MD  06/20/18 3156

## 2018-06-20 NOTE — DISCHARGE INSTRUCTIONS
Continue your medications as prescribed  Get plenty of rest   If you are experiencing an increasing frequency of seizures, you should follow up with your neurologist for medications adjustments  Return to the ED for seizures that are coming back to back, lasting longer than 2 min, or any new associated problems

## 2021-03-20 ENCOUNTER — APPOINTMENT (EMERGENCY)
Dept: RADIOLOGY | Facility: HOSPITAL | Age: 26
End: 2021-03-20

## 2021-03-20 ENCOUNTER — HOSPITAL ENCOUNTER (EMERGENCY)
Facility: HOSPITAL | Age: 26
Discharge: HOME | End: 2021-03-20
Attending: EMERGENCY MEDICINE

## 2021-03-20 VITALS
BODY MASS INDEX: 27.06 KG/M2 | RESPIRATION RATE: 20 BRPM | OXYGEN SATURATION: 99 % | HEART RATE: 60 BPM | SYSTOLIC BLOOD PRESSURE: 110 MMHG | WEIGHT: 194 LBS | TEMPERATURE: 97.3 F | DIASTOLIC BLOOD PRESSURE: 65 MMHG

## 2021-03-20 DIAGNOSIS — R56.9 SEIZURE (HCC): Primary | ICD-10-CM

## 2021-03-20 DIAGNOSIS — F19.90 DRUG USE: ICD-10-CM

## 2021-03-20 LAB
ALBUMIN SERPL BCP-MCNC: 4.2 G/DL (ref 3.5–5.7)
ALP SERPL-CCNC: 45 U/L (ref 40–150)
ALT SERPL W P-5'-P-CCNC: 33 U/L (ref 7–52)
AMPHETAMINES SERPL QL SCN: NEGATIVE
ANION GAP SERPL CALCULATED.3IONS-SCNC: 8 MMOL/L (ref 4–13)
AST SERPL W P-5'-P-CCNC: 20 U/L (ref 13–39)
ATRIAL RATE: 77 BPM
BARBITURATES UR QL: NEGATIVE
BASOPHILS # BLD AUTO: 0.1 THOUSANDS/ΜL (ref 0–0.1)
BASOPHILS NFR BLD AUTO: 1 % (ref 0–2)
BENZODIAZ UR QL: NEGATIVE
BILIRUB SERPL-MCNC: 0.3 MG/DL (ref 0.2–1)
BUN SERPL-MCNC: 12 MG/DL (ref 7–25)
CALCIUM SERPL-MCNC: 9.1 MG/DL (ref 8.6–10.5)
CHLORIDE SERPL-SCNC: 104 MMOL/L (ref 98–107)
CO2 SERPL-SCNC: 25 MMOL/L (ref 21–31)
COCAINE UR QL: NEGATIVE
CREAT SERPL-MCNC: 1 MG/DL (ref 0.7–1.3)
EOSINOPHIL # BLD AUTO: 0.3 THOUSAND/ΜL (ref 0–0.61)
EOSINOPHIL NFR BLD AUTO: 2 % (ref 0–5)
ERYTHROCYTE [DISTWIDTH] IN BLOOD BY AUTOMATED COUNT: 13.3 % (ref 11.5–14.5)
ETHANOL SERPL-MCNC: <10 MG/DL
FLUAV RNA RESP QL NAA+PROBE: NEGATIVE
FLUBV RNA RESP QL NAA+PROBE: NEGATIVE
GFR SERPL CREATININE-BSD FRML MDRD: 102 ML/MIN/1.73SQ M
GLUCOSE SERPL-MCNC: 91 MG/DL (ref 65–99)
HCT VFR BLD AUTO: 44.2 % (ref 42–47)
HGB BLD-MCNC: 14.6 G/DL (ref 14–18)
LYMPHOCYTES # BLD AUTO: 1.8 THOUSANDS/ΜL (ref 0.6–4.47)
LYMPHOCYTES NFR BLD AUTO: 12 % (ref 21–51)
MCH RBC QN AUTO: 29.5 PG (ref 26–34)
MCHC RBC AUTO-ENTMCNC: 33.1 G/DL (ref 31–37)
MCV RBC AUTO: 89 FL (ref 81–99)
METHADONE UR QL: NEGATIVE
MONOCYTES # BLD AUTO: 1.3 THOUSAND/ΜL (ref 0.17–1.22)
MONOCYTES NFR BLD AUTO: 8 % (ref 2–12)
NEUTROPHILS # BLD AUTO: 11.7 THOUSANDS/ΜL (ref 1.4–6.5)
NEUTS SEG NFR BLD AUTO: 77 % (ref 42–75)
OPIATES UR QL SCN: NEGATIVE
OXYCODONE+OXYMORPHONE UR QL SCN: POSITIVE
P AXIS: 17 DEGREES
PCP UR QL: NEGATIVE
PLATELET # BLD AUTO: 321 THOUSANDS/UL (ref 149–390)
PMV BLD AUTO: 7.9 FL (ref 8.6–11.7)
POTASSIUM SERPL-SCNC: 4 MMOL/L (ref 3.5–5.5)
PR INTERVAL: 146 MS
PROT SERPL-MCNC: 6.8 G/DL (ref 6.4–8.9)
QRS AXIS: 55 DEGREES
QRSD INTERVAL: 98 MS
QT INTERVAL: 378 MS
QTC INTERVAL: 427 MS
RBC # BLD AUTO: 4.96 MILLION/UL (ref 4.3–5.9)
RSV RNA RESP QL NAA+PROBE: NEGATIVE
SARS-COV-2 RNA RESP QL NAA+PROBE: NEGATIVE
SODIUM SERPL-SCNC: 137 MMOL/L (ref 134–143)
T WAVE AXIS: 13 DEGREES
THC UR QL: POSITIVE
TROPONIN I SERPL-MCNC: <0.03 NG/ML
VENTRICULAR RATE: 77 BPM
WBC # BLD AUTO: 15.1 THOUSAND/UL (ref 4.8–10.8)

## 2021-03-20 PROCEDURE — 80307 DRUG TEST PRSMV CHEM ANLYZR: CPT | Performed by: EMERGENCY MEDICINE

## 2021-03-20 PROCEDURE — 80053 COMPREHEN METABOLIC PANEL: CPT | Performed by: EMERGENCY MEDICINE

## 2021-03-20 PROCEDURE — 96361 HYDRATE IV INFUSION ADD-ON: CPT

## 2021-03-20 PROCEDURE — 93005 ELECTROCARDIOGRAM TRACING: CPT

## 2021-03-20 PROCEDURE — 85025 COMPLETE CBC W/AUTO DIFF WBC: CPT | Performed by: EMERGENCY MEDICINE

## 2021-03-20 PROCEDURE — 96374 THER/PROPH/DIAG INJ IV PUSH: CPT

## 2021-03-20 PROCEDURE — 93010 ELECTROCARDIOGRAM REPORT: CPT | Performed by: INTERNAL MEDICINE

## 2021-03-20 PROCEDURE — 71045 X-RAY EXAM CHEST 1 VIEW: CPT

## 2021-03-20 PROCEDURE — 36415 COLL VENOUS BLD VENIPUNCTURE: CPT | Performed by: EMERGENCY MEDICINE

## 2021-03-20 PROCEDURE — 99284 EMERGENCY DEPT VISIT MOD MDM: CPT

## 2021-03-20 PROCEDURE — 84484 ASSAY OF TROPONIN QUANT: CPT | Performed by: EMERGENCY MEDICINE

## 2021-03-20 PROCEDURE — 99285 EMERGENCY DEPT VISIT HI MDM: CPT | Performed by: EMERGENCY MEDICINE

## 2021-03-20 PROCEDURE — 82077 ASSAY SPEC XCP UR&BREATH IA: CPT | Performed by: EMERGENCY MEDICINE

## 2021-03-20 PROCEDURE — 0241U HB NFCT DS VIR RESP RNA 4 TRGT: CPT | Performed by: EMERGENCY MEDICINE

## 2021-03-20 RX ORDER — LEVETIRACETAM 500 MG/1
500 TABLET ORAL EVERY 12 HOURS SCHEDULED
Qty: 30 TABLET | Refills: 0 | Status: SHIPPED | OUTPATIENT
Start: 2021-03-20 | End: 2021-03-21 | Stop reason: SDUPTHER

## 2021-03-20 RX ORDER — SODIUM CHLORIDE 9 MG/ML
125 INJECTION, SOLUTION INTRAVENOUS CONTINUOUS
Status: DISCONTINUED | OUTPATIENT
Start: 2021-03-20 | End: 2021-03-20 | Stop reason: HOSPADM

## 2021-03-20 RX ADMIN — SODIUM CHLORIDE 125 ML/HR: 0.9 INJECTION, SOLUTION INTRAVENOUS at 15:31

## 2021-03-20 RX ADMIN — LEVETIRACETAM 1000 MG: 100 INJECTION, SOLUTION INTRAVENOUS at 15:31

## 2021-03-20 NOTE — DISCHARGE INSTRUCTIONS
Do not do drugs  Take Keppra 1 pill twice a day  You should consider following up with a neurologist to get chronic care  He should not be driving right now  Please follow-up with a neurologist within a week  This is very important

## 2021-03-20 NOTE — ED PROVIDER NOTES
History  Chief Complaint   Patient presents with    Seizure - Prior Hx Of     56-year-old male his with a history of seizure disorder presents to the emergency department with a history of a seizure  Patient denies any inciting event and notes that his seizure was witnessed by family who saw him shaking and bleeding from his mouth  Patient also notes that he had a seizure last week as well but did not seek any medical attention  Patient used to be on Dilantin for seizures, but did not refill his medication and has been off meds for at least a year  Patient denies any substance use history with the exception of marijuana  Patient also notes he smokes  There is no history of trauma or fever or significant headache  Patient is visiting from [place]          Prior to Admission Medications   Prescriptions Last Dose Informant Patient Reported? Taking?   levETIRAcetam (KEPPRA) 500 mg tablet   No No   Sig: Take 1 tablet (500 mg total) by mouth 2 (two) times a day for 30 days      Facility-Administered Medications: None       Past Medical History:   Diagnosis Date    Asthma     Seizures ([facility] [address] )        Past Surgical History:   Procedure Laterality Date    KIDNEY SURGERY      NEPHRECTOMY Left     Stopped functioning       History reviewed  No pertinent family history  I have reviewed and agree with the history as documented  E-Cigarette/Vaping    E-Cigarette Use Current Every Day User      E-Cigarette/Vaping Substances    Nicotine Yes     THC Yes     CBD Yes     Flavoring Yes      Social History     Tobacco Use    Smoking status: Current Every Day Smoker     Packs/day: 0 20    Smokeless tobacco: Never Used   Substance Use Topics    Alcohol use: Yes     Frequency: 2-3 times a week     Drinks per session: 3 or 4     Binge frequency: Never     Comment: ocass   Drug use: Yes     Types: Marijuana       Review of Systems   Constitutional: Positive for activity change  Negative for fever     HENT: Positive for drooling  Negative for trouble swallowing  Respiratory: Negative for chest tightness and shortness of breath  Cardiovascular: Negative for chest pain  Gastrointestinal: Negative for abdominal distention and abdominal pain  Musculoskeletal: Negative for arthralgias and back pain  Neurological: Positive for seizures  Physical Exam  Physical Exam  Constitutional:       General: He is not in acute distress  Appearance: Normal appearance  He is normal weight  He is not ill-appearing  Comments: Patient is alert oriented x3, no acute distress and is appropriate  Patient is not postictal   HENT:      Head: Normocephalic and atraumatic  Right Ear: External ear normal       Left Ear: External ear normal       Nose: Nose normal       Mouth/Throat:      Mouth: Mucous membranes are moist       Comments: Abrasion noted to the left side of the patient's tongue without any significant laceration  Bleeding is controlled  Eyes:      Conjunctiva/sclera: Conjunctivae normal    Neck:      Musculoskeletal: Normal range of motion  Cardiovascular:      Rate and Rhythm: Normal rate and regular rhythm  Pulses: Normal pulses  Heart sounds: Normal heart sounds  Pulmonary:      Effort: Pulmonary effort is normal       Breath sounds: Normal breath sounds  Abdominal:      General: Abdomen is flat  There is no distension  Palpations: Abdomen is soft  There is no mass  Musculoskeletal: Normal range of motion  General: No swelling, tenderness or deformity  Skin:     General: Skin is warm and dry  Capillary Refill: Capillary refill takes 2 to 3 seconds  Coloration: Skin is not pale  Neurological:      General: No focal deficit present  Mental Status: He is alert and oriented to person, place, and time  Mental status is at baseline     Psychiatric:         Mood and Affect: Mood normal          Vital Signs  ED Triage Vitals   Temperature Pulse Respirations Blood Pressure SpO2   03/20/21 1208 03/20/21 1208 03/20/21 1208 03/20/21 1208 03/20/21 1208   (!) 97 3 °F (36 3 °C) 71 20 118/64 98 %      Temp Source Heart Rate Source Patient Position - Orthostatic VS BP Location FiO2 (%)   03/20/21 1208 03/20/21 1208 03/20/21 1330 03/20/21 1330 --   Tympanic Monitor Lying Left arm       Pain Score       03/20/21 1208       7           Vitals:    03/20/21 1208 03/20/21 1330 03/20/21 1500   BP: 118/64 115/65 110/65   Pulse: 71 65 60   Patient Position - Orthostatic VS:  Lying          Visual Acuity      ED Medications  Medications   levETIRAcetam (KEPPRA) 1,000 mg in sodium chloride 0 9 % 100 mL IVPB (0 mg Intravenous Stopped 3/20/21 1546)       Diagnostic Studies  Results Reviewed     Procedure Component Value Units Date/Time    Rapid drug screen, urine [[id number]]  (Abnormal) Collected: 03/20/21 1531    Lab Status: Final result Specimen: Urine, Catheter Updated: 03/20/21 1551     Amph/Meth UR Negative     Barbiturate Ur Negative     Benzodiazepine Urine Negative     Cocaine Urine Negative     Methadone Urine Negative     Opiate Urine Negative     PCP Ur Negative     THC Urine Positive     Oxycodone Urine Positive    Narrative:      Presumptive report  If requested, specimen will be sent to reference lab for confirmation  FOR MEDICAL PURPOSES ONLY  IF CONFIRMATION NEEDED PLEASE CONTACT THE LAB WITHIN 5 DAYS      Drug Screen Cutoff Levels:  AMPHETAMINE/METHAMPHETAMINES  1000 ng/mL  BARBITURATES     200 ng/mL  BENZODIAZEPINES     200 ng/mL  COCAINE      300 ng/mL  METHADONE      300 ng/mL  OPIATES      300 ng/mL  PHENCYCLIDINE     25 ng/mL  THC       50 ng/mL  OXYCODONE      100 ng/mL    Ethanol [[id number]]  (Normal) Collected: 03/20/21 1341    Lab Status: Final result Specimen: Blood from Arm, Left Updated: 03/20/21 1427     Ethanol Lvl <10 mg/dL     COVID[facility], [facility] [[id number]]  (Normal) Collected: 03/20/21 1342    Lab Status: Final result Specimen: Nares from Nasopharyngeal Swab Updated: 03/20/21 1427     SARS-CoV-2 Negative     INFLUENZA A PCR Negative     INFLUENZA B PCR Negative     RSV PCR Negative    Narrative: This test has been authorized by FDA under an EUA (Emergency Use Assay) for use by authorized laboratories  Clinical caution and judgement should be used with the interpretation of these results with consideration of the clinical impression and other laboratory testing  Testing reported as "Positive" or "Negative" has been proven to be accurate according to standard laboratory validation requirements  All testing is performed with control materials showing appropriate reactivity at standard intervals      Comprehensive metabolic panel [[id number]] Collected: 03/20/21 1341    Lab Status: Final result Specimen: Blood from Arm, Left Updated: 03/20/21 1408     Sodium 137 mmol/L      Potassium 4 0 mmol/L      Chloride 104 mmol/L      CO2 25 mmol/L      ANION GAP 8 mmol/L      BUN 12 mg/dL      Creatinine 1 00 mg/dL      Glucose 91 mg/dL      Calcium 9 1 mg/dL      AST 20 U/L      ALT 33 U/L      Alkaline Phosphatase 45 U/L      Total Protein 6 8 g/dL      Albumin 4 2 g/dL      Total Bilirubin 0 30 mg/dL      eGFR 102 ml/min/1 73sq m     Narrative:      [facility] guidelines for Chronic Kidney Disease (CKD):     Stage 1 with normal or high GFR (GFR > 90 mL/min/1 73 square meters)    Stage 2 Mild CKD (GFR = 60-89 mL/min/1 73 square meters)    Stage 3A Moderate CKD (GFR = 45-59 mL/min/1 73 square meters)    Stage 3B Moderate CKD (GFR = 30-44 mL/min/1 73 square meters)    Stage 4 Severe CKD (GFR = 15-29 mL/min/1 73 square meters)    Stage 5 End Stage CKD (GFR <15 mL/min/1 73 square meters)  Note: GFR calculation is accurate only with a steady state creatinine    Troponin I [[id number]]  (Normal) Collected: 03/20/21 1341    Lab Status: Final result Specimen: Blood from Arm, Left Updated: 03/20/21 1408     Troponin I <0 03 ng/mL     CBC and differential [[id number]]  (Abnormal) Collected: 03/20/21 1341    Lab Status: Final result Specimen: Blood from Arm, Left Updated: 03/20/21 1353     WBC 15 10 Thousand/uL      RBC 4 96 Million/uL      Hemoglobin 14 6 g/dL      Hematocrit 44 2 %      MCV 89 fL      MCH 29 5 pg      MCHC 33 1 g/dL      RDW 13 3 %      MPV 7 9 fL      Platelets 377 Thousands/uL      Neutrophils Relative 77 %      Lymphocytes Relative 12 %      Monocytes Relative 8 %      Eosinophils Relative 2 %      Basophils Relative 1 %      Neutrophils Absolute 11 70 Thousands/µL      Lymphocytes Absolute 1 80 Thousands/µL      Monocytes Absolute 1 30 Thousand/µL      Eosinophils Absolute 0 30 Thousand/µL      Basophils Absolute 0 10 Thousands/µL                  XR chest 1 view portable    (Results Pending)              Procedures  ECG 12 Lead Documentation Only    Date/Time: 3/20/2021 1:21 PM  Performed by: Esme De León DO  Authorized by: Esme De León DO     ECG reviewed by me, the ED Provider: yes    Patient location:  ED  Comments:      EKG shows a normal sinus rhythm at 77 per with a normal axis there is a right bundle-branch block pattern but otherwise no other definitive acute ST or T-wave changes  ED Course  ED Course as of Mar 20 2301   Sat Mar 20, 2021   1320 Upon evaluating previous records, it appears that the patient was on Keppra in 2018 for seizure and was on 500 b i d     The patient states that that sounds correct  1422 WBC(!): 15 10   1616 Rapid drug screen, urine(!)   1626 Discussed case with patient's family  Patient currently does not have a license    It was discussed that he is not able to drive      2604 Rapid drug screen, urine(!)                                           MDM    Disposition  Final diagnoses:   Seizure [facility])   Drug use     Time reflects when diagnosis was documented in both MDM as applicable and the Disposition within this note     Time User Action Codes Description Comment    3/20/2021  4:17 PM Kamille Ramos [R56 9] Seizure ([address] )     3/20/2021 11:01 PM Kamille Ramos [F19 90] Drug use       ED Disposition     ED Disposition Condition Date/Time Comment    Discharge Stable Sat Mar 20, 2021  4:17 PM Julee Harding discharge to home/self care              Follow-up Information    None         Discharge Medication List as of 3/20/2021  4:21 PM      CONTINUE these medications which have CHANGED    Details   levETIRAcetam (KEPPRA) 500 mg tablet Take 1 tablet (500 mg total) by mouth every 12 (twelve) hours, Starting Sat 3/20/2021, Normal               PDMP Review     None          ED Provider  Electronically Signed by           Hernando Page DO  03/20/21 3107

## 2021-03-21 RX ORDER — LEVETIRACETAM 500 MG/1
500 TABLET ORAL EVERY 12 HOURS SCHEDULED
Qty: 30 TABLET | Refills: 0 | Status: SHIPPED | OUTPATIENT
Start: 2021-03-21 | End: 2021-04-17 | Stop reason: SDUPTHER

## 2021-04-17 ENCOUNTER — HOSPITAL ENCOUNTER (EMERGENCY)
Facility: HOSPITAL | Age: 26
Discharge: HOME | End: 2021-04-17
Attending: EMERGENCY MEDICINE | Admitting: EMERGENCY MEDICINE

## 2021-04-17 VITALS
TEMPERATURE: 97.2 F | BODY MASS INDEX: 28.44 KG/M2 | OXYGEN SATURATION: 99 % | HEART RATE: 75 BPM | RESPIRATION RATE: 19 BRPM | DIASTOLIC BLOOD PRESSURE: 58 MMHG | WEIGHT: 203.93 LBS | SYSTOLIC BLOOD PRESSURE: 117 MMHG

## 2021-04-17 DIAGNOSIS — R56.9 SEIZURE (HCC): Primary | ICD-10-CM

## 2021-04-17 LAB
ALBUMIN SERPL BCP-MCNC: 4 G/DL (ref 3.5–5)
ALP SERPL-CCNC: 69 U/L (ref 46–116)
ALT SERPL W P-5'-P-CCNC: 30 U/L (ref 12–78)
ANION GAP SERPL CALCULATED.3IONS-SCNC: 9 MMOL/L (ref 4–13)
AST SERPL W P-5'-P-CCNC: 20 U/L (ref 5–45)
BACTERIA UR QL AUTO: ABNORMAL /HPF
BASOPHILS # BLD AUTO: 0.11 THOUSANDS/ΜL (ref 0–0.1)
BASOPHILS NFR BLD AUTO: 1 % (ref 0–1)
BILIRUB SERPL-MCNC: 0.32 MG/DL (ref 0.2–1)
BILIRUB UR QL STRIP: NEGATIVE
BUN SERPL-MCNC: 9 MG/DL (ref 5–25)
CALCIUM SERPL-MCNC: 9.1 MG/DL (ref 8.3–10.1)
CHLORIDE SERPL-SCNC: 104 MMOL/L (ref 100–108)
CLARITY UR: CLEAR
CO2 SERPL-SCNC: 27 MMOL/L (ref 21–32)
COLOR UR: YELLOW
CREAT SERPL-MCNC: 1.05 MG/DL (ref 0.6–1.3)
EOSINOPHIL # BLD AUTO: 0.27 THOUSAND/ΜL (ref 0–0.61)
EOSINOPHIL NFR BLD AUTO: 2 % (ref 0–6)
ERYTHROCYTE [DISTWIDTH] IN BLOOD BY AUTOMATED COUNT: 12.2 % (ref 11.6–15.1)
GFR SERPL CREATININE-BSD FRML MDRD: 96 ML/MIN/1.73SQ M
GLUCOSE SERPL-MCNC: 102 MG/DL (ref 65–140)
GLUCOSE UR STRIP-MCNC: NEGATIVE MG/DL
HCT VFR BLD AUTO: 43.1 % (ref 36.5–49.3)
HGB BLD-MCNC: 14.8 G/DL (ref 12–17)
HGB UR QL STRIP.AUTO: ABNORMAL
IMM GRANULOCYTES # BLD AUTO: 0.04 THOUSAND/UL (ref 0–0.2)
IMM GRANULOCYTES NFR BLD AUTO: 0 % (ref 0–2)
KETONES UR STRIP-MCNC: ABNORMAL MG/DL
LEUKOCYTE ESTERASE UR QL STRIP: NEGATIVE
LYMPHOCYTES # BLD AUTO: 1.86 THOUSANDS/ΜL (ref 0.6–4.47)
LYMPHOCYTES NFR BLD AUTO: 14 % (ref 14–44)
MAGNESIUM SERPL-MCNC: 2.5 MG/DL (ref 1.6–2.6)
MCH RBC QN AUTO: 29.2 PG (ref 26.8–34.3)
MCHC RBC AUTO-ENTMCNC: 34.3 G/DL (ref 31.4–37.4)
MCV RBC AUTO: 85 FL (ref 82–98)
MONOCYTES # BLD AUTO: 0.99 THOUSAND/ΜL (ref 0.17–1.22)
MONOCYTES NFR BLD AUTO: 8 % (ref 4–12)
MUCOUS THREADS UR QL AUTO: ABNORMAL
NEUTROPHILS # BLD AUTO: 9.74 THOUSANDS/ΜL (ref 1.85–7.62)
NEUTS SEG NFR BLD AUTO: 75 % (ref 43–75)
NITRITE UR QL STRIP: NEGATIVE
NON-SQ EPI CELLS URNS QL MICRO: ABNORMAL /HPF
NRBC BLD AUTO-RTO: 0 /100 WBCS
PH UR STRIP.AUTO: 6.5 [PH]
PLATELET # BLD AUTO: 339 THOUSANDS/UL (ref 149–390)
PMV BLD AUTO: 9.3 FL (ref 8.9–12.7)
POTASSIUM SERPL-SCNC: 3.7 MMOL/L (ref 3.5–5.3)
PROT SERPL-MCNC: 7.4 G/DL (ref 6.4–8.2)
PROT UR STRIP-MCNC: ABNORMAL MG/DL
RBC # BLD AUTO: 5.07 MILLION/UL (ref 3.88–5.62)
RBC #/AREA URNS AUTO: ABNORMAL /HPF
SODIUM SERPL-SCNC: 140 MMOL/L (ref 136–145)
SP GR UR STRIP.AUTO: 1.02 (ref 1–1.03)
UROBILINOGEN UR QL STRIP.AUTO: 0.2 E.U./DL
WBC # BLD AUTO: 13.01 THOUSAND/UL (ref 4.31–10.16)
WBC #/AREA URNS AUTO: ABNORMAL /HPF

## 2021-04-17 PROCEDURE — 83735 ASSAY OF MAGNESIUM: CPT | Performed by: EMERGENCY MEDICINE

## 2021-04-17 PROCEDURE — 96360 HYDRATION IV INFUSION INIT: CPT

## 2021-04-17 PROCEDURE — 85025 COMPLETE CBC W/AUTO DIFF WBC: CPT | Performed by: EMERGENCY MEDICINE

## 2021-04-17 PROCEDURE — 81001 URINALYSIS AUTO W/SCOPE: CPT | Performed by: EMERGENCY MEDICINE

## 2021-04-17 PROCEDURE — 36415 COLL VENOUS BLD VENIPUNCTURE: CPT | Performed by: EMERGENCY MEDICINE

## 2021-04-17 PROCEDURE — 93005 ELECTROCARDIOGRAM TRACING: CPT

## 2021-04-17 PROCEDURE — 80053 COMPREHEN METABOLIC PANEL: CPT | Performed by: EMERGENCY MEDICINE

## 2021-04-17 PROCEDURE — 99284 EMERGENCY DEPT VISIT MOD MDM: CPT

## 2021-04-17 PROCEDURE — 99285 EMERGENCY DEPT VISIT HI MDM: CPT | Performed by: EMERGENCY MEDICINE

## 2021-04-17 RX ORDER — LEVETIRACETAM 500 MG/1
500 TABLET ORAL ONCE
Status: COMPLETED | OUTPATIENT
Start: 2021-04-17 | End: 2021-04-17

## 2021-04-17 RX ORDER — LEVETIRACETAM 500 MG/1
500 TABLET ORAL EVERY 12 HOURS SCHEDULED
Qty: 60 TABLET | Refills: 0 | Status: SHIPPED | OUTPATIENT
Start: 2021-04-17 | End: 2021-05-26 | Stop reason: SDUPTHER

## 2021-04-17 RX ADMIN — SODIUM CHLORIDE 1000 ML: 0.9 INJECTION, SOLUTION INTRAVENOUS at 02:17

## 2021-04-17 RX ADMIN — LEVETIRACETAM 500 MG: 500 TABLET, FILM COATED ORAL at 02:17

## 2021-04-17 NOTE — Clinical Note
Carlitos Gerard was seen and treated in our emergency department on 4/17/2021  Diagnosis:     Chas    He may return on this date: 04/18/2021         If you have any questions or concerns, please don't hesitate to call        Nava Urbina DO    ______________________________           _______________          _______________  Hospital Representative                              Date                                Time

## 2021-04-17 NOTE — ED PROVIDER NOTES
History  Chief Complaint   Patient presents with    Seizure - Prior Hx Of     Patient reports he woke up and he had bit his tongue and his body was sore so he knows he had a seizure  Patient reports he has been out of medication x 4 days  Patient is a 41-year-old male past medical history of seizure disorder on Keppra, asthma, left nephrectomy presenting for seizure  Patient states that he woke from sleep at 12:30 p m , roughly 1 5 hours ago and states that his whole body hurt and that he had bit his tongue which is how he knows he had a seizure  He has not taken his Keppra for the last 4 days and denies any seizures since his last ED visit 1 month ago when he was restarted on his Keppra  He denies taking any other medications for seizure  He also notes decreased sleep, decreased p o  Intake and states that he smokes cigarettes, occasionally uses alcohol, uses marijuana  He notes right-sided midback pain for the last 2 weeks which he states feels like kidney pain  Denies any chest pain, shortness breath, dysuria, hematuria, dizziness, fevers, nausea vomiting, rashes, upper respiratory symptoms, cough, vision changes  Has not followed up with Neurology since last visit  Denies any bowel or bladder incontinence and states that this happened while he was in bed therefore no head trauma  States that the event was unwitnessed  Prior to Admission Medications   Prescriptions Last Dose Informant Patient Reported?  Taking?   levETIRAcetam (KEPPRA) 500 mg tablet   No No   Sig: Take 1 tablet (500 mg total) by mouth every 12 (twelve) hours   levETIRAcetam (KEPPRA) 500 mg tablet   No No   Sig: Take 1 tablet (500 mg total) by mouth every 12 (twelve) hours      Facility-Administered Medications: None       Past Medical History:   Diagnosis Date    Asthma     Seizures ([facility] [address] )        Past Surgical History:   Procedure Laterality Date    KIDNEY SURGERY      NEPHRECTOMY Left     Stopped functioning History reviewed  No pertinent family history  I have reviewed and agree with the history as documented  E-Cigarette/Vaping    E-Cigarette Use Current Every Day User      E-Cigarette/Vaping Substances    Nicotine Yes     THC Yes     CBD Yes     Flavoring Yes      Social History     Tobacco Use    Smoking status: Current Every Day Smoker     Packs/day: 0 20    Smokeless tobacco: Never Used   Substance Use Topics    Alcohol use: Yes     Frequency: 2-3 times a week     Drinks per session: 3 or 4     Binge frequency: Never     Comment: ocass   Drug use: Yes     Types: Marijuana       Review of Systems   All other systems reviewed and are negative  Physical Exam  Physical Exam  Vitals signs reviewed  Constitutional:       General: He is not in acute distress  Appearance: Normal appearance  He is not ill-appearing  HENT:      Head: Normocephalic and atraumatic  Mouth/Throat:      Mouth: Mucous membranes are moist    Eyes:      Extraocular Movements: Extraocular movements intact  Conjunctiva/sclera: Conjunctivae normal    Neck:      Musculoskeletal: Neck supple  Cardiovascular:      Rate and Rhythm: Normal rate and regular rhythm  Heart sounds: Normal heart sounds  Pulmonary:      Effort: Pulmonary effort is normal       Breath sounds: Normal breath sounds  Abdominal:      General: Abdomen is flat  Palpations: Abdomen is soft  Tenderness: There is no abdominal tenderness  Musculoskeletal: Normal range of motion  General: No swelling  Skin:     General: Skin is warm and dry  Neurological:      General: No focal deficit present  Mental Status: He is alert  Cranial Nerves: No cranial nerve deficit  Sensory: No sensory deficit  Motor: No weakness        Coordination: Coordination normal    Psychiatric:         Mood and Affect: Mood normal          Vital Signs  ED Triage Vitals [04/17/21 0150]   Temperature Pulse Respirations Blood Pressure SpO2   (!) 97 2 °F (36 2 °C) 86 18 117/58 96 %      Temp Source Heart Rate Source Patient Position - Orthostatic VS BP Location FiO2 (%)   Oral Monitor Sitting Right arm --      Pain Score       3           Vitals:    04/17/21 0150   BP: 117/58   Pulse: 86   Patient Position - Orthostatic VS: Sitting         Visual Acuity      ED Medications  Medications   sodium chloride 0 9 % bolus 1,000 mL (1,000 mL Intravenous New Bag 4/17/21 0217)   levETIRAcetam (KEPPRA) tablet 500 mg (500 mg Oral Given 4/17/21 0217)       Diagnostic Studies  Results Reviewed     Procedure Component Value Units Date/Time    Comprehensive metabolic panel [[id number]] Collected: 04/17/21 0217    Lab Status: Final result Specimen: Blood from Arm, Right Updated: 04/17/21 0238     Sodium 140 mmol/L      Potassium 3 7 mmol/L      Chloride 104 mmol/L      CO2 27 mmol/L      ANION GAP 9 mmol/L      BUN 9 mg/dL      Creatinine 1 05 mg/dL      Glucose 102 mg/dL      Calcium 9 1 mg/dL      AST 20 U/L      ALT 30 U/L      Alkaline Phosphatase 69 U/L      Total Protein 7 4 g/dL      Albumin 4 0 g/dL      Total Bilirubin 0 32 mg/dL      eGFR 96 ml/min/1 73sq m     Narrative:      [place] guidelines for Chronic Kidney Disease (CKD):     Stage 1 with normal or high GFR (GFR > 90 mL/min/1 73 square meters)    Stage 2 Mild CKD (GFR = 60-89 mL/min/1 73 square meters)    Stage 3A Moderate CKD (GFR = 45-59 mL/min/1 73 square meters)    Stage 3B Moderate CKD (GFR = 30-44 mL/min/1 73 square meters)    Stage 4 Severe CKD (GFR = 15-29 mL/min/1 73 square meters)    Stage 5 End Stage CKD (GFR <15 mL/min/1 73 square meters)  Note: GFR calculation is accurate only with a steady state creatinine    Magnesium [[id number]]  (Normal) Collected: 04/17/21 0217    Lab Status: Final result Specimen: Blood from Arm, Right Updated: 04/17/21 0238     Magnesium 2 5 mg/dL     Urine Microscopic [[id number]]  (Abnormal) Collected: 04/17/21 0217 Lab Status: Final result Specimen: Urine, Clean Catch Updated: 04/17/21 0233     RBC, UA 2-4 /hpf      WBC, UA 1-2 /hpf      Epithelial Cells Occasional /hpf      Bacteria, UA Occasional /hpf      MUCUS THREADS Occasional    UA w Reflex to Microscopic w Reflex to Culture [[id number]]  (Abnormal) Collected: 04/17/21 0217    Lab Status: Final result Specimen: Urine, Clean Catch Updated: 04/17/21 0225     Color, UA Yellow     Clarity, UA Clear     Specific Gravity, UA 1 025     pH, UA 6 5     Leukocytes, UA Negative     Nitrite, UA Negative     Protein, UA 30 (1+) mg/dl      Glucose, UA Negative mg/dl      Ketones, UA Trace mg/dl      Urobilinogen, UA 0 2 E U /dl      Bilirubin, UA Negative     Blood, UA Small    CBC and differential [[id number]]  (Abnormal) Collected: 04/17/21 0217    Lab Status: Final result Specimen: Blood from Arm, Right Updated: 04/17/21 0223     WBC 13 01 Thousand/uL      RBC 5 07 Million/uL      Hemoglobin 14 8 g/dL      Hematocrit 43 1 %      MCV 85 fL      MCH 29 2 pg      MCHC 34 3 g/dL      RDW 12 2 %      MPV 9 3 fL      Platelets 838 Thousands/uL      nRBC 0 /100 WBCs      Neutrophils Relative 75 %      Immat GRANS % 0 %      Lymphocytes Relative 14 %      Monocytes Relative 8 %      Eosinophils Relative 2 %      Basophils Relative 1 %      Neutrophils Absolute 9 74 Thousands/µL      Immature Grans Absolute 0 04 Thousand/uL      Lymphocytes Absolute 1 86 Thousands/µL      Monocytes Absolute 0 99 Thousand/µL      Eosinophils Absolute 0 27 Thousand/µL      Basophils Absolute 0 11 Thousands/µL                  No orders to display              Procedures  ECG 12 Lead Documentation Only    Date/Time: 4/17/2021 2:20 AM  Performed by: Abram Diaz DO  Authorized by: Abram Diaz DO     ECG reviewed by me, the ED Provider: yes    Patient location:  ED  Previous ECG:     Previous ECG:  Compared to current    Similarity:  No change  Interpretation:     Interpretation: normal    Rate: ECG rate assessment: normal    Rhythm:     Rhythm: sinus rhythm    Ectopy:     Ectopy: none    QRS:     QRS axis:  Normal    QRS intervals: Wide  Conduction:     Conduction: abnormal      Abnormal conduction: incomplete RBBB    ST segments:     ST segments:  Normal  T waves:     T waves: normal               ED Course  ED Course as of Apr 17 0249   Sat Apr 17, 2021   0245 Labs unremarkable, urinalysis with protein urine which I have discussed with patient that he requires primary care follow up, Neurology follow-up and will refill his Keppra  Patient states he understands and have discussed return precautions of abdominal pain, back pain, fevers, nausea or vomiting and patient states he understands  MDM  Number of Diagnoses or Management Options  Diagnosis management comments: Patient is a 17-year-old male past medical history of seizure disorder, asthma presenting for seizure  Patient is well-appearing bedside stable vitals and in no acute distress  He has no gross abnormalities on neurologic exam notes noncompliance with his medication  As he notes right-sided back pain though he has no CVA tenderness will obtain labs, urinalysis however if unremarkable will discharge with Neurology follow-up and will give Keppra and fluids here  Disposition  Final diagnoses:   Seizure [facility])     Time reflects when diagnosis was documented in both MDM as applicable and the Disposition within this note     Time User Action Codes Description Comment    4/17/2021  2:48 AM Krystina Lion Add [R56 9] Seizure [facility])       ED Disposition     ED Disposition Condition Date/Time Comment    Discharge Stable Sat Apr 17, 2021  2:48 AM Peterson Cerrato discharge to home/self care              Follow-up Information     Follow up With Specialties Details Why Contact Info Additional Information    David Walters DO Family Medicine Schedule an appointment as soon as possible for a visit in 1 week  [facility] Neurology Schedule an appointment as soon as possible for a visit   [address] [facility] [address]  [address] [facility], [address], [place], [place], [address] [phone]          Current Discharge Medication List      CONTINUE these medications which have CHANGED    Details   levETIRAcetam (KEPPRA) 500 mg tablet Take 1 tablet (500 mg total) by mouth every 12 (twelve) hours  Qty: 60 tablet, Refills: 0    Associated Diagnoses: Seizure ([address] )           No discharge procedures on file      PDMP Review     None          ED Provider  Electronically Signed by           Star Naranjo DO  04/17/21 9478

## 2021-04-19 LAB
ATRIAL RATE: 86 BPM
P AXIS: 49 DEGREES
PR INTERVAL: 152 MS
QRS AXIS: 82 DEGREES
QRSD INTERVAL: 110 MS
QT INTERVAL: 380 MS
QTC INTERVAL: 454 MS
T WAVE AXIS: 32 DEGREES
VENTRICULAR RATE: 86 BPM

## 2021-04-19 PROCEDURE — 93010 ELECTROCARDIOGRAM REPORT: CPT | Performed by: INTERNAL MEDICINE

## 2021-05-20 ENCOUNTER — TELEPHONE (OUTPATIENT)
Dept: NEUROLOGY | Facility: CLINIC | Age: 26
End: 2021-05-20

## 2021-05-20 NOTE — TELEPHONE ENCOUNTER
Best contact number for patient:  [phone]  Emergency Contact name and number:  James Molina [phone]  Referring provider and telephone number:  Chevy Fermin [phone]  Primary Care Provider Name and if affiliated with [place] [id number] Yes  Reason for Appointment/Dx:  seizures  Have you seen and followed up with a pediatric Neurologist for this disease in the past?      No (If yes ok to schedule with Dr Sho Cerda)    Neurology Location patient would like to be seen:  [address] [place] [address] received? Yes                                                 Records Received? Yes    Have you ever seen another Neurologist?       No    Insurance Information    Insurance Name:Self pay    ID/Policy #:    Secondary Insurance:    ID/Policy#: Workman's Comp/ Accident/ School  Information      Workman's Comp/Accident/School related?        No    If yes name of Insurance company:    Claim #:    Date of Injury:    Type of Injury:     Name and Telephone Number:    Notes:                   Appointment date:   9/15/2021

## 2021-05-26 ENCOUNTER — OFFICE VISIT (OUTPATIENT)
Dept: FAMILY MEDICINE CLINIC | Facility: CLINIC | Age: 26
End: 2021-05-26

## 2021-05-26 VITALS
HEIGHT: 73 IN | WEIGHT: 188.6 LBS | HEART RATE: 108 BPM | OXYGEN SATURATION: 96 % | SYSTOLIC BLOOD PRESSURE: 120 MMHG | DIASTOLIC BLOOD PRESSURE: 76 MMHG | BODY MASS INDEX: 25 KG/M2 | TEMPERATURE: 99.1 F | RESPIRATION RATE: 16 BRPM

## 2021-05-26 DIAGNOSIS — G43.009 MIGRAINE WITHOUT AURA AND WITHOUT STATUS MIGRAINOSUS, NOT INTRACTABLE: ICD-10-CM

## 2021-05-26 DIAGNOSIS — Z00.00 HEALTHCARE MAINTENANCE: Primary | ICD-10-CM

## 2021-05-26 DIAGNOSIS — R56.9 SEIZURE (HCC): ICD-10-CM

## 2021-05-26 PROCEDURE — 99385 PREV VISIT NEW AGE 18-39: CPT | Performed by: NURSE PRACTITIONER

## 2021-05-26 RX ORDER — TOPIRAMATE 15 MG/1
15 CAPSULE, COATED PELLETS ORAL 2 TIMES DAILY
Qty: 60 CAPSULE | Refills: 3 | Status: SHIPPED | OUTPATIENT
Start: 2021-05-26 | End: 2021-06-25

## 2021-05-26 RX ORDER — LEVETIRACETAM 500 MG/1
500 TABLET ORAL EVERY 12 HOURS SCHEDULED
Qty: 60 TABLET | Refills: 3 | Status: SHIPPED | OUTPATIENT
Start: 2021-05-26 | End: 2022-02-03

## 2021-05-26 NOTE — ASSESSMENT & PLAN NOTE
- Stable  -  Discussed the importance of medication compliance with taking the Keppra twice daily every day to prevent seizures  -  Continue to follow up with neurologist   -  Will continue to monitor

## 2021-05-26 NOTE — ASSESSMENT & PLAN NOTE
-  Discussed immunizations, screening, healthy diet, exercise, and safety measures  -  Routine blood work ordered  Will contact patient with results

## 2021-05-26 NOTE — ASSESSMENT & PLAN NOTE
-  Not well controlled  -  Will start patient on Topamax 15 mg b i d   Advised of side effects  Advised this may also help with his seizures  -  Recommend follow-up in 1 month

## 2021-05-26 NOTE — PROGRESS NOTES
Assessment/Plan:    Healthcare maintenance  -  Discussed immunizations, screening, healthy diet, exercise, and safety measures  -  Routine blood work ordered  Will contact patient with results  Migraine without aura and without status migrainosus, not intractable  -  Not well controlled  -  Will start patient on Topamax 15 mg b i d   Advised of side effects  Advised this may also help with his seizures  -  Recommend follow-up in 1 month  Seizure ([address] )  - Stable  -  Discussed the importance of medication compliance with taking the Keppra twice daily every day to prevent seizures  -  Continue to follow up with neurologist   -  Will continue to monitor  Diagnoses and all orders for this visit:    Healthcare maintenance  -     CBC and differential; Future  -     Comprehensive metabolic panel; Future  -     Lipid Panel with Direct LDL reflex; Future  -     TSH, 3rd generation with Free T4 reflex; Future    Seizure (HCC)  -     levETIRAcetam (KEPPRA) 500 mg tablet; Take 1 tablet (500 mg total) by mouth every 12 (twelve) hours    Migraine without aura and without status migrainosus, not intractable  -     topiramate (TOPAMAX) 15 mg capsule; Take 1 capsule (15 mg total) by mouth 2 (two) times a day        Subjective:      Patient ID: Jann Parks is a 27 y o  male  Patient presents today to establish care  He is accompanied by his mother  He does have a past medical history of left nephrectomy as a seizure disorder on Keppra  He was recently admitted to the ER in April after experiencing a seizure  He states that he had not taken his Keppra for 4 days  He does have an appointment with a neurologist been on till September  He is also complaining today of headaches that were increasing in frequency  They are occurring about 3-4 times per week  He denies any sensitivity to light but does admit to sensitivity to sound  He denies any associated nausea vomiting    He has had migraines in the past and feels like this is the same  He does not take anything for the pain  M*Modal software was used to dictate this note  It may contain errors with dictating incorrect words/spelling  Please contact provider directly for any questions  The following portions of the patient's history were reviewed and updated as appropriate: allergies, current medications, past family history, past medical history, past social history, past surgical history and problem list     Review of Systems   Constitutional: Negative for fatigue and fever  HENT: Negative for trouble swallowing  Eyes: Negative for visual disturbance  Respiratory: Negative for cough and shortness of breath  Cardiovascular: Negative for chest pain and palpitations  Gastrointestinal: Negative for abdominal pain and blood in stool  Endocrine: Negative for cold intolerance and heat intolerance  Genitourinary: Negative for difficulty urinating and dysuria  Musculoskeletal: Negative for gait problem  Skin: Negative for rash  Allergic/Immunologic: Negative for environmental allergies  Neurological: Positive for headaches  Negative for dizziness and syncope  Hematological: Negative for adenopathy  Psychiatric/Behavioral: Negative for behavioral problems  Objective:      /76 (BP Location: Left arm, Patient Position: Sitting, Cuff Size: Large)   Pulse (!) 108   Temp 99 1 °F (37 3 °C) (Tympanic)   Resp 16   Ht 6' 1" (1 854 m)   Wt 85 5 kg (188 lb 9 6 oz)   SpO2 96%   BMI 24 88 kg/m²          Physical Exam  Vitals signs and nursing note reviewed  Constitutional:       Appearance: Normal appearance  HENT:      Head: Normocephalic and atraumatic  Right Ear: Tympanic membrane, ear canal and external ear normal       Left Ear: Tympanic membrane, ear canal and external ear normal       Nose: No congestion  Eyes:      Extraocular Movements: Extraocular movements intact        Conjunctiva/sclera: Conjunctivae normal  Pupils: Pupils are equal, round, and reactive to light  Neck:      Musculoskeletal: Normal range of motion  Cardiovascular:      Rate and Rhythm: Normal rate and regular rhythm  Heart sounds: Normal heart sounds  Pulmonary:      Effort: Pulmonary effort is normal       Breath sounds: Normal breath sounds  Abdominal:      General: Bowel sounds are normal       Palpations: Abdomen is soft  Tenderness: There is no abdominal tenderness  Musculoskeletal: Normal range of motion  Right lower leg: No edema  Left lower leg: No edema  Lymphadenopathy:      Cervical: No cervical adenopathy  Skin:     General: Skin is warm and dry  Neurological:      Mental Status: He is alert and oriented to person, place, and time  Cranial Nerves: No cranial nerve deficit        Coordination: Finger-Nose-Finger Test normal    Psychiatric:         Mood and Affect: Mood normal          Behavior: Behavior normal

## 2021-07-13 ENCOUNTER — TELEPHONE (OUTPATIENT)
Dept: NEUROLOGY | Facility: CLINIC | Age: 26
End: 2021-07-13

## 2021-07-13 NOTE — TELEPHONE ENCOUNTER
Left message for patient to schedule sooner appt - availability with Dr Zeus Manuel in [place] on 7/13 @ 2:30 - please assist if still available

## 2021-09-07 ENCOUNTER — TELEPHONE (OUTPATIENT)
Dept: NEUROLOGY | Facility: CLINIC | Age: 26
End: 2021-09-07

## 2021-09-14 ENCOUNTER — TELEPHONE (OUTPATIENT)
Dept: NEUROLOGY | Facility: CLINIC | Age: 26
End: 2021-09-14

## 2022-02-02 DIAGNOSIS — R56.9 SEIZURE (HCC): ICD-10-CM

## 2022-02-02 NOTE — TELEPHONE ENCOUNTER
pharmacy requesting refill on keppra  No phone number in chart for pt  Lm for pt mother to call office back to schedule appt for pt     Last seen 5/26/21  Canceled 6/23/21 and no show 6/30/21

## 2022-02-03 RX ORDER — LEVETIRACETAM 500 MG/1
TABLET ORAL
Qty: 30 TABLET | Refills: 0 | Status: SHIPPED | OUTPATIENT
Start: 2022-02-03 | End: 2022-02-17

## 2022-02-17 DIAGNOSIS — R56.9 SEIZURE (HCC): ICD-10-CM

## 2022-02-17 NOTE — TELEPHONE ENCOUNTER
Pt has not been here since 5/26/21 and canceled 6/23/21 and no show on 6/30/21  I spoke with hi mom on 2/2/22 and she was going to inform him just a 1 week supply would be sent and he needs to be seen  Pt did not schedule appointment  I called pt mom today and she will inform him that a visit is needed  I sent to [facility] to approve or refuse

## 2022-02-18 RX ORDER — LEVETIRACETAM 500 MG/1
TABLET ORAL
Qty: 14 TABLET | Refills: 0 | Status: SHIPPED | OUTPATIENT
Start: 2022-02-18

## 2022-02-18 NOTE — TELEPHONE ENCOUNTER
Pt mom was made aware again that he needs a visit for any further refills   She agreed to inform him

## 2022-10-12 PROBLEM — Z00.00 HEALTHCARE MAINTENANCE: Status: RESOLVED | Noted: 2021-05-26 | Resolved: 2022-10-12

## 2024-10-25 ENCOUNTER — HOSPITAL ENCOUNTER (OUTPATIENT)
Dept: RADIOLOGY | Facility: HOSPITAL | Age: 29
End: 2024-10-25
Payer: COMMERCIAL

## 2024-10-25 ENCOUNTER — OFFICE VISIT (OUTPATIENT)
Dept: FAMILY MEDICINE CLINIC | Facility: CLINIC | Age: 29
End: 2024-10-25

## 2024-10-25 VITALS
HEART RATE: 77 BPM | DIASTOLIC BLOOD PRESSURE: 82 MMHG | TEMPERATURE: 98 F | BODY MASS INDEX: 30.88 KG/M2 | SYSTOLIC BLOOD PRESSURE: 128 MMHG | OXYGEN SATURATION: 98 % | WEIGHT: 228 LBS | RESPIRATION RATE: 18 BRPM | HEIGHT: 72 IN

## 2024-10-25 DIAGNOSIS — M54.41 RIGHT-SIDED LOW BACK PAIN WITH RIGHT-SIDED SCIATICA, UNSPECIFIED CHRONICITY: ICD-10-CM

## 2024-10-25 DIAGNOSIS — R56.9 SEIZURE (HCC): Primary | ICD-10-CM

## 2024-10-25 PROCEDURE — 72110 X-RAY EXAM L-2 SPINE 4/>VWS: CPT

## 2024-10-25 PROCEDURE — 96372 THER/PROPH/DIAG INJ SC/IM: CPT

## 2024-10-25 PROCEDURE — 99204 OFFICE O/P NEW MOD 45 MIN: CPT

## 2024-10-25 RX ORDER — IBUPROFEN 800 MG/1
800 TABLET, FILM COATED ORAL EVERY 8 HOURS PRN
Qty: 30 TABLET | Refills: 0 | Status: SHIPPED | OUTPATIENT
Start: 2024-10-25

## 2024-10-25 RX ORDER — LIDOCAINE 40 MG/G
CREAM TOPICAL DAILY
Qty: 60 G | Refills: 0 | Status: SHIPPED | OUTPATIENT
Start: 2024-10-25 | End: 2024-11-01

## 2024-10-25 RX ORDER — KETOROLAC TROMETHAMINE 30 MG/ML
30 INJECTION, SOLUTION INTRAMUSCULAR; INTRAVENOUS ONCE
Status: COMPLETED | OUTPATIENT
Start: 2024-10-25 | End: 2024-10-25

## 2024-10-25 RX ORDER — LEVETIRACETAM 500 MG/1
500 TABLET ORAL EVERY 12 HOURS
Qty: 180 TABLET | Refills: 0 | Status: SHIPPED | OUTPATIENT
Start: 2024-10-25 | End: 2025-01-23

## 2024-10-25 RX ADMIN — KETOROLAC TROMETHAMINE 30 MG: 30 INJECTION, SOLUTION INTRAMUSCULAR; INTRAVENOUS at 11:32

## 2024-10-25 NOTE — PROGRESS NOTES
Ambulatory Visit  Name: Chas Carson      : 1995      MRN: [id number]  Encounter Provider: THERESA Gutierrez  Encounter Date: 10/25/2024   Encounter department: [facility]    Assessment & Plan  Seizure (HCC)    Orders:    levETIRAcetam (KEPPRA) 500 mg tablet; Take 1 tablet (500 mg total) by mouth every 12 (twelve) hours    Comprehensive metabolic panel; Future    CBC and differential; Future    TSH, 3rd generation with Free T4 reflex; Future    Lipid panel; Future  -   Right-sided low back pain with right-sided sciatica, unspecified chronicity     Orders:    Ambulatory Referral to Physical Therapy; Future    ibuprofen (MOTRIN) 800 mg tablet; Take 1 tablet (800 mg total) by mouth every 8 (eight) hours as needed for mild pain or moderate pain    lidocaine (LMX) 4 % cream; Apply topically daily for 7 days    Comprehensive metabolic panel; Future    CBC and differential; Future    Vitamin D 25 hydroxy; Future    TSH, 3rd generation with Free T4 reflex; Future    Lipid panel; Future    ketorolac (TORADOL) injection 30 mg    Ambulatory Referral to Orthopedic Surgery; Future       History of Present Illness      Chas Carson a male with a past medical history of Asthma and Seizures (HCC) presents today for a new Pt establishment. Pt takes Keppra for seizure control. He reports he been seizure free for about 16 months. He also reports that he had not been taking his medication for about few months due to it running out and did not have insurance and refill.He just got new insurance.   His complaint today includes right lower back pain that radiates down to right leg. Reports numbness and tingling in the right leg. Stated that the pain started about 2 years ago, it went away but for past 3 months it started. He reports about a week ago while in the shower, he tried to bend, loss his balance, sensation in b/l LE and he fell to the floor. He denies any injury. 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 He reports 10/10 sharp/aching pain, numbness/hotness. He dose not sleep, the pain wakes him up every morning around 3-4AM. Has tried OTC for pain but not effective, try exercising but it is not working either.     Back Pain  This is a recurrent (Right lower) problem. The current episode started more than 1 month ago. The problem occurs constantly. The problem is unchanged. The pain is present in the lumbar spine and gluteal. The quality of the pain is described as shooting, aching, cramping and stabbing. The pain radiates to the right foot and right thigh. The pain is at a severity of 10/10. The pain is severe. The pain is The same all the time. The symptoms are aggravated by bending. Associated symptoms include leg pain, numbness, tingling and weakness. Pertinent negatives include no bladder incontinence, bowel incontinence or pelvic pain. He has tried NSAIDs and home exercises for the symptoms. The treatment provided no relief.       History obtained from : patient  Review of Systems   Gastrointestinal:  Negative for bowel incontinence.   Genitourinary:  Negative for bladder incontinence and pelvic pain.   Musculoskeletal:  Positive for back pain.   Neurological:  Positive for tingling, weakness and numbness.     Pertinent Medical History   Past Medical History:   Diagnosis Date    Asthma     Seizures (HCC)        Current Outpatient Medications on File Prior to Visit   Medication Sig Dispense Refill    topiramate (TOPAMAX) 15 mg capsule Take 1 capsule (15 mg total) by mouth 2 (two) times a day 60 capsule 3    [DISCONTINUED] levETIRAcetam (KEPPRA) 500 mg tablet TAKE 1 TABLET BY MOUTH EVERY 12 HOURS 14 tablet 0     No current facility-administered medications on file prior to visit.      Social History     Tobacco Use    Smoking status: Every Day     Current packs/day: 0.20     Types: Cigarettes     Passive exposure: Current    Smokeless tobacco: Never   Vaping  Use    Vaping status: Former    Substances: Nicotine, THC, CBD, Flavoring   Substance and Sexual Activity    Alcohol use: Yes     Comment: ocass.    Drug use: Not Currently     Types: Marijuana    Sexual activity: Not on file         Objective     /82 (BP Location: Right arm, Patient Position: Sitting, Cuff Size: Large)   Pulse 77   Temp 98 °F (36.7 °C) (Temporal)   Resp 18   Ht 6' (1.829 m)   Wt 103 kg (228 lb)   SpO2 98%   BMI 30.92 kg/m²     Physical Exam  Constitutional:       Appearance: Normal appearance.   HENT:      Head: Normocephalic and atraumatic.   Cardiovascular:      Rate and Rhythm: Normal rate and regular rhythm.   Pulmonary:      Effort: Pulmonary effort is normal.      Breath sounds: Normal breath sounds.   Abdominal:      General: Bowel sounds are normal.      Palpations: Abdomen is soft.      Tenderness: There is no abdominal tenderness. There is no right CVA tenderness or left CVA tenderness.   Musculoskeletal:         General: Tenderness present. No swelling.      Cervical back: Normal range of motion and neck supple.      Lumbar back: Tenderness present.        Back:    Skin:     General: Skin is warm.   Neurological:      Mental Status: He is alert and oriented to person, place, and time.      Sensory: No sensory deficit.      Motor: No weakness.      Coordination: Coordination normal.      Gait: Gait normal.   Psychiatric:         Mood and Affect: Mood normal.

## 2024-10-25 NOTE — PATIENT INSTRUCTIONS
Patient Education   Patient Education     Exercise Band Exercises for the Back and Hips   About this topic   Using an exercise band is one way to strengthen your muscles. You can use an exercise band at home, work, or when you travel. You can buy one at a sporting goods store or online and most cost less than 15 dollars. The color of the band lets you know how much tension it will give your muscles. The colors may differ slightly with each company that makes them. Ask what color band you should use.  General   Before starting with a program, ask your doctor if you are healthy enough to do these exercises. Your doctor may have you work with a  or physical therapist to make a safe exercise program to meet your needs. Be sure to check the rubber tubing or band for signs of tears or weakness before using it.  Strengthening Exercises   Strengthening exercises keep your muscles firm and strong. Start by repeating each exercise 2 to 3 times. Work up to doing each exercise 10 times. Try to do the exercises 2 to 3 times each day. Do all exercises slowly.  Shoulder blade squeezes ? Make a knot in the center of a long piece of band. Shut the knot in the door at waist level. Be sure to avoid the area next to the door handle as the door mechanism could tear the band. Face the door. Wrap the ends of the band around your hands for a good . Start far enough away so you have a slight tension in the band. Stagger your feet with one foot about 6 inches more forward than the other foot. This prevents too much pressure on your lower back and helps with balance. Stand with good posture. Be sure your shoulders do not raise up when performing this exercise. Pull back by bending your elbows and squeezing your shoulder blades together at the same time. Hold 3 to 5 seconds. You can try shutting the knot lower in the door and pulling upwards to exercise your upper back muscles. You can also shut the knot higher and pull downwards to  exercises the lower back muscles more.  Hip exercises ? Tie a knot in a long piece of band to make a loop. Secure the band in the door by shutting the knot in at ankle level. Step into the loop with one foot. Back away from the door enough to have a slight tension in the band. After you are done exercising one leg, switch legs and repeat these exercises on your other leg. Be sure to hold onto something steady like a chair or walker if you have trouble with balance.  Hip bending forward ? Stand facing away from the door. Keeping your knee straight, bring your leg forward. Now, bring it back.  Hip bending backwards ? Stand facing the door. Keeping your knee straight, bring your leg backward. Then, bring it back.  Hip sideways bending, away from body ? Stand facing sideways with your leg furthest from the door in the band loop. Keep your knee straight and bring your leg sideways away from your body. Now, bring it back.  Hip sideways bending across body ? Stand facing sideways with your leg closest to the door in the band loop. Keep your knee straight and bring your leg across your body. Now, bring it back.             What will the results be?   Stronger muscles  More toned back and thighs  Better posture  Better balance  Easier to walk and do other activities  Helpful tips   Stay active and work out to keep your muscles strong and flexible.  Keep a healthy weight to avoid putting too much stress on your joints. Eat a healthy diet to keep your muscles healthy.  Be sure you do not hold your breath when exercising. This can raise your blood pressure. If you tend to hold your breath, try counting out loud when exercising. If any exercise bothers you, stop right away.  Try walking or cycling at an easy pace for a few minutes to warm up your muscles. Do this again after exercising.  Exercise may be slightly uncomfortable, but you should not have sharp pains. If you do get sharp pains, stop what you are doing. If the sharp  pains continue, call your doctor.  Last Reviewed Date   2021-06-24  Consumer Information Use and Disclaimer   This generalized information is a limited summary of diagnosis, treatment, and/or medication information. It is not meant to be comprehensive and should be used as a tool to help the user understand and/or assess potential diagnostic and treatment options. It does NOT include all information about conditions, treatments, medications, side effects, or risks that may apply to a specific patient. It is not intended to be medical advice or a substitute for the medical advice, diagnosis, or treatment of a health care provider based on the health care provider's examination and assessment of a patient’s specific and unique circumstances. Patients must speak with a health care provider for complete information about their health, medical questions, and treatment options, including any risks or benefits regarding use of medications. This information does not endorse any treatments or medications as safe, effective, or approved for treating a specific patient. UpToDate, Inc. and its affiliates disclaim any warranty or liability relating to this information or the use thereof. The use of this information is governed by the Terms of Use, available at https://www.[employer].com/en/know/clinical-effectiveness-terms   Copyright   Copyright © 2024 UpToDate, Inc. and its affiliates and/or licensors. All rights reserved.    Low back pain - Discharge instructions   The Basics   Written by the doctors and editors at [employer]   What are discharge instructions? -- Discharge instructions are information about how to take care of yourself after getting medical care for a health problem.  What is low back pain? -- Low back pain is pain or discomfort in the lower part of your back. Many people have low back pain at some point, and it most often gets better on its own. Many different things can cause low back pain. Most of the  "time, doctors do not know the exact cause.  Back pain can happen if you strain a muscle. This is often what has happened when a person \"throws out\" their back. This refers to pain that starts suddenly after physical activity, like lifting something heavy or bending the back.  Back pain can also happen if you have (figure 1):   Damaged, bulging, or torn discs   Arthritis affecting the joints of the spine   Bony growths on the vertebrae that crowd nearby nerves   A \"compression fracture\" due to osteoporosis (a condition that makes your bones weak)   A vertebra out of place   Narrowing in the spinal canal   A tumor or infection (but this is very rare)  How do I care for myself at home? -- Ask the doctor or nurse what you should do when you go home. Make sure that you understand exactly what you need to do to care for yourself. Ask questions if there is anything you do not understand.  You should also:   Use heat on your back for short periods of time, if it helps with pain. Put a heating pad (on the low setting) on your back for 20 minutes at a time a few times each day. Never go to sleep with heat on your back.   Try to stay as active as you can without causing too much pain, if your doctor or nurse said that it is OK. If your pain is severe, you might need to rest for a day or 2. But it's important to get back to walking and moving as soon as possible. Try to keep doing your normal daily activities. Get up and move around gently during the day as you are able.   Slowly start to increase your activity level as you are able to. If something causes your pain to come back or get worse, stop and go back to doing easier activities that did not hurt.   Avoid sitting or standing in 1 position for a long time. You might want to sleep with a pillow under or between your knees if this eases your pain.   Take a medicine like ibuprofen (sample brand names: Advil, Motrin) or naproxen (brand name: Aleve) for pain, if needed. These " "are nonsteroidal antiinflammatory drugs (\"NSAIDs\"). They might work better than acetaminophen for low back pain.   Talk to your doctor or nurse before trying any of the following. These treatments might help you feel better for a little while:   Spinal manipulation - This is when a chiropractor, physical therapist, or other professional moves or \"adjusts\" the joints of your back.   Acupuncture - This is when someone who knows traditional Chinese medicine inserts tiny needles through your skin to block pain signals.   Massage therapy - The massage therapist manipulates your muscles and soft tissues to decrease muscle tension and increase relaxation.  What follow-up care do I need? -- Your doctor or nurse will tell you if you need to make a follow-up appointment. If so, make sure that you know when and where to go. The doctor might suggest that you see a physical therapist to learn exercises to help with your back pain.  When should I call the doctor? -- Call for emergency help right away (in the [place] and [place], call 9-1-1) if:   You are unable to walk, or cannot control your bowels or bladder.   You develop a fever of 100.4°F (38°C) or higher, chills, or night sweats.  Call your doctor for advice if:   Your legs are numb, weak, or tingly.   Your pain is getting worse, even with medicines and rest.   You develop a new rash.  All topics are updated as new evidence becomes available and our peer review process is complete.  This topic retrieved from [facility] on: May 15, 2024.  Topic 600306 Version 1.0  Release: 32.4.3 - C32.134  © 2024 UpToDate, Inc. and/or its affiliates. All rights reserved.  figure 1: Anatomy of the back     This drawing shows the different parts of the back. Back pain can be caused by problems with the muscles, ligaments, discs, bones (vertebrae), or nerves.  Graphic 23781 Version 6.0  Consumer Information Use and Disclaimer   Disclaimer: This generalized information is a limited summary of " diagnosis, treatment, and/or medication information. It is not meant to be comprehensive and should be used as a tool to help the user understand and/or assess potential diagnostic and treatment options. It does NOT include all information about conditions, treatments, medications, side effects, or risks that may apply to a specific patient. It is not intended to be medical advice or a substitute for the medical advice, diagnosis, or treatment of a health care provider based on the health care provider's examination and assessment of a patient's specific and unique circumstances. Patients must speak with a health care provider for complete information about their health, medical questions, and treatment options, including any risks or benefits regarding use of medications. This information does not endorse any treatments or medications as safe, effective, or approved for treating a specific patient. UpToDate, Inc. and its affiliates disclaim any warranty or liability relating to this information or the use thereof.The use of this information is governed by the Terms of Use, available at https://www.[employer].com/en/know/clinical-effectiveness-terms. 2024© UpToDate, Inc. and its affiliates and/or licensors. All rights reserved.  Copyright   © 2024 UpToDate, Inc. and/or its affiliates. All rights reserved.  low back pain

## 2024-10-25 NOTE — ASSESSMENT & PLAN NOTE
Orders:    levETIRAcetam (KEPPRA) 500 mg tablet; Take 1 tablet (500 mg total) by mouth every 12 (twelve) hours    Comprehensive metabolic panel; Future    CBC and differential; Future    TSH, 3rd generation with Free T4 reflex; Future    Lipid panel; Future  -

## 2024-10-30 ENCOUNTER — OFFICE VISIT (OUTPATIENT)
Age: 29
End: 2024-10-30
Payer: COMMERCIAL

## 2024-10-30 ENCOUNTER — LAB (OUTPATIENT)
Dept: LAB | Facility: HOSPITAL | Age: 29
End: 2024-10-30
Payer: COMMERCIAL

## 2024-10-30 VITALS
WEIGHT: 228.8 LBS | SYSTOLIC BLOOD PRESSURE: 114 MMHG | BODY MASS INDEX: 30.99 KG/M2 | HEIGHT: 72 IN | DIASTOLIC BLOOD PRESSURE: 76 MMHG

## 2024-10-30 DIAGNOSIS — M54.41 RIGHT-SIDED LOW BACK PAIN WITH RIGHT-SIDED SCIATICA, UNSPECIFIED CHRONICITY: ICD-10-CM

## 2024-10-30 DIAGNOSIS — R56.9 SEIZURE (HCC): ICD-10-CM

## 2024-10-30 DIAGNOSIS — M54.41 ACUTE RIGHT-SIDED LOW BACK PAIN WITH RIGHT-SIDED SCIATICA: Primary | ICD-10-CM

## 2024-10-30 LAB
25(OH)D3 SERPL-MCNC: 17.4 NG/ML (ref 30–100)
ALBUMIN SERPL BCG-MCNC: 4.8 G/DL (ref 3.5–5)
ALP SERPL-CCNC: 58 U/L (ref 34–104)
ALT SERPL W P-5'-P-CCNC: 19 U/L (ref 7–52)
ANION GAP SERPL CALCULATED.3IONS-SCNC: 8 MMOL/L (ref 4–13)
AST SERPL W P-5'-P-CCNC: 18 U/L (ref 13–39)
BASOPHILS # BLD AUTO: 0.07 THOUSANDS/ΜL (ref 0–0.1)
BASOPHILS NFR BLD AUTO: 1 % (ref 0–1)
BILIRUB SERPL-MCNC: 0.38 MG/DL (ref 0.2–1)
BUN SERPL-MCNC: 11 MG/DL (ref 5–25)
CALCIUM SERPL-MCNC: 10 MG/DL (ref 8.4–10.2)
CHLORIDE SERPL-SCNC: 102 MMOL/L (ref 96–108)
CHOLEST SERPL-MCNC: 243 MG/DL
CO2 SERPL-SCNC: 32 MMOL/L (ref 21–32)
CREAT SERPL-MCNC: 0.97 MG/DL (ref 0.6–1.3)
EOSINOPHIL # BLD AUTO: 0.51 THOUSAND/ΜL (ref 0–0.61)
EOSINOPHIL NFR BLD AUTO: 5 % (ref 0–6)
ERYTHROCYTE [DISTWIDTH] IN BLOOD BY AUTOMATED COUNT: 12.5 % (ref 11.6–15.1)
GFR SERPL CREATININE-BSD FRML MDRD: 103 ML/MIN/1.73SQ M
GLUCOSE P FAST SERPL-MCNC: 80 MG/DL (ref 65–99)
HCT VFR BLD AUTO: 45.7 % (ref 36.5–49.3)
HDLC SERPL-MCNC: 52 MG/DL
HGB BLD-MCNC: 15.1 G/DL (ref 12–17)
IMM GRANULOCYTES # BLD AUTO: 0.04 THOUSAND/UL (ref 0–0.2)
IMM GRANULOCYTES NFR BLD AUTO: 0 % (ref 0–2)
LDLC SERPL CALC-MCNC: 158 MG/DL (ref 0–100)
LYMPHOCYTES # BLD AUTO: 1.82 THOUSANDS/ΜL (ref 0.6–4.47)
LYMPHOCYTES NFR BLD AUTO: 17 % (ref 14–44)
MCH RBC QN AUTO: 29.7 PG (ref 26.8–34.3)
MCHC RBC AUTO-ENTMCNC: 33 G/DL (ref 31.4–37.4)
MCV RBC AUTO: 90 FL (ref 82–98)
MONOCYTES # BLD AUTO: 1.27 THOUSAND/ΜL (ref 0.17–1.22)
MONOCYTES NFR BLD AUTO: 12 % (ref 4–12)
NEUTROPHILS # BLD AUTO: 7.1 THOUSANDS/ΜL (ref 1.85–7.62)
NEUTS SEG NFR BLD AUTO: 65 % (ref 43–75)
NONHDLC SERPL-MCNC: 191 MG/DL
NRBC BLD AUTO-RTO: 0 /100 WBCS
PLATELET # BLD AUTO: 345 THOUSANDS/UL (ref 149–390)
PMV BLD AUTO: 9.7 FL (ref 8.9–12.7)
POTASSIUM SERPL-SCNC: 4.1 MMOL/L (ref 3.5–5.3)
PROT SERPL-MCNC: 7.7 G/DL (ref 6.4–8.4)
RBC # BLD AUTO: 5.09 MILLION/UL (ref 3.88–5.62)
SODIUM SERPL-SCNC: 142 MMOL/L (ref 135–147)
TRIGL SERPL-MCNC: 167 MG/DL
TSH SERPL DL<=0.05 MIU/L-ACNC: 1.9 UIU/ML (ref 0.45–4.5)
WBC # BLD AUTO: 10.81 THOUSAND/UL (ref 4.31–10.16)

## 2024-10-30 PROCEDURE — 82306 VITAMIN D 25 HYDROXY: CPT

## 2024-10-30 PROCEDURE — 84443 ASSAY THYROID STIM HORMONE: CPT

## 2024-10-30 PROCEDURE — 80053 COMPREHEN METABOLIC PANEL: CPT

## 2024-10-30 PROCEDURE — 36415 COLL VENOUS BLD VENIPUNCTURE: CPT

## 2024-10-30 PROCEDURE — 80061 LIPID PANEL: CPT

## 2024-10-30 PROCEDURE — 85025 COMPLETE CBC W/AUTO DIFF WBC: CPT

## 2024-10-30 PROCEDURE — 99203 OFFICE O/P NEW LOW 30 MIN: CPT | Performed by: PHYSICIAN ASSISTANT

## 2024-10-30 RX ORDER — METHYLPREDNISOLONE 4 MG/1
TABLET ORAL
Qty: 21 EACH | Refills: 0 | Status: SHIPPED | OUTPATIENT
Start: 2024-10-30

## 2024-10-30 RX ORDER — MELOXICAM 15 MG/1
15 TABLET ORAL DAILY
Qty: 30 TABLET | Refills: 0 | Status: SHIPPED | OUTPATIENT
Start: 2024-10-30

## 2024-10-30 NOTE — PROGRESS NOTES
Patient Name:  Chas Carson  MRN:  [id number]    Assessment & Plan     Right-sided lumbar spine pain with right-sided sciatica  No red flags appreciated examination today.  Prescription for Medrol Dosepak.  Advised against taking oral NSAIDs while taking Medrol Dosepak.  Prescription also provided for meloxicam to be initiated once Medrol Dosepak is complete.  Advised against taking over-the-counter anti-inflammatories while taking the meloxicam.  Referral to physical therapy.  Follow-up in 6 weeks.  Consider advanced imaging if symptoms persist    Chief Complaint     Low back pain    History of the Present Illness     Chas Carson is a 29 y.o. male who reports to the office today for evaluation of his lumbar spine.  He notes an onset of pain approximately 2 to 3 months ago.  He denies any injury or trauma.  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  He notes primarily right-sided low back pain with radiation distally into the right buttock and along the posterior thigh to the knee.  Currently pain is 5 out of 10 in intensity but can reach 10 out of 10 intensity with increased activity such as lifting and bending.  He did note an episode approximate 2 weeks ago where he bent over and he had increased pain which caused his legs to give out with associated numbness and tingling.  He denies any current numbness and tingling in the lower extremities.  He denies any weakness currently in the lower extremities as well.  He describes a sharp pain in the right lumbar spine with burning pain distally into the right lower extremity.  He has been taking over-the-counter ibuprofen with limited improvement.  He denies any bowel or bladder dysfunction.  No saddle paresthesias.  No fevers or chills.    Physical Exam     /76 (BP Location: Left arm, Patient Position: Sitting, Cuff Size: Standard)   Ht 6' (1.829 m)   Wt 104 kg (228 lb 12.8 oz)   BMI 31.03  kg/m²     Lumbar spine: No gross deformity.  No tenderness midline and paraspinal musculature bilaterally.  No tenderness bilateral SI joints as well.  There is tenderness right piriformis musculature.  No tenderness left piriformis musculature.  Motor/sensation intact L2-S1 bilaterally with 5 out of 5 strength.  Negative straight leg raise bilaterally.  Negative PACHECO test bilaterally.    Eyes: Anicteric sclerae.  ENT: Trachea midline.  Lungs: Normal respiratory effort.  CV: Capillary refill is less than 2 seconds.  Skin: Intact without erythema.  Lymph: No palpable lymphadenopathy.  Neuro: Sensation is grossly intact to light touch.  Psych: Mood and affect are appropriate.    Data Review     I have personally reviewed pertinent films in PACS, and my interpretation follows:    X-rays lumbar spine 10/25/2024: No acute osseous abnormality.  No fracture or signs of instability.  No significant degenerative changes.    Past Medical History:   Diagnosis Date    Asthma     Seizures (HCC)        Past Surgical History:   Procedure Laterality Date    KIDNEY SURGERY      NEPHRECTOMY Left     Stopped functioning       No Known Allergies    Current Outpatient Medications on File Prior to Visit   Medication Sig Dispense Refill    ibuprofen (MOTRIN) 800 mg tablet Take 1 tablet (800 mg total) by mouth every 8 (eight) hours as needed for mild pain or moderate pain 30 tablet 0    levETIRAcetam (KEPPRA) 500 mg tablet Take 1 tablet (500 mg total) by mouth every 12 (twelve) hours 180 tablet 0    lidocaine (LMX) 4 % cream Apply topically daily for 7 days 60 g 0    topiramate (TOPAMAX) 15 mg capsule Take 1 capsule (15 mg total) by mouth 2 (two) times a day 60 capsule 3     No current facility-administered medications on file prior to visit.       Social History     Tobacco Use    Smoking status: Every Day     Current packs/day: 0.20     Types: Cigarettes     Passive exposure: Current    Smokeless tobacco: Never   Vaping Use    Vaping  status: Former    Substances: Nicotine, THC, CBD, Flavoring   Substance Use Topics    Alcohol use: Yes     Comment: ocass.    Drug use: Not Currently     Types: Marijuana       History reviewed. No pertinent family history.    Review of Systems     As stated in the HPI. All other systems reviewed and are negative.

## 2024-10-31 DIAGNOSIS — E55.9 VITAMIN D DEFICIENCY: Primary | ICD-10-CM

## 2024-10-31 NOTE — RESULT ENCOUNTER NOTE
Please let Pt know that he has some abnormal labs:(1) WBC is mildly elevated, we continue to monitor. (2) His cholesterol levels are borderline high, we encourage healthy diet such fruit and vegetables, low fat diet and exercise. (3) Vit D level is low. I sent sent prescription to his pharmacy for Vit D supplement. Will do a repeat lab 6 week after start the med around the last of December.

## 2024-11-07 ENCOUNTER — OFFICE VISIT (OUTPATIENT)
Dept: FAMILY MEDICINE CLINIC | Facility: CLINIC | Age: 29
End: 2024-11-07

## 2024-11-07 VITALS
HEIGHT: 72 IN | TEMPERATURE: 97.6 F | HEART RATE: 82 BPM | BODY MASS INDEX: 30.88 KG/M2 | OXYGEN SATURATION: 98 % | SYSTOLIC BLOOD PRESSURE: 116 MMHG | WEIGHT: 228 LBS | RESPIRATION RATE: 17 BRPM | DIASTOLIC BLOOD PRESSURE: 80 MMHG

## 2024-11-07 DIAGNOSIS — Z02.4 ENCOUNTER FOR DRIVER'S LICENSE HISTORY AND PHYSICAL: Primary | ICD-10-CM

## 2024-11-07 DIAGNOSIS — R56.9 SEIZURE (HCC): ICD-10-CM

## 2024-11-07 DIAGNOSIS — R12 HEART BURN: ICD-10-CM

## 2024-11-07 PROCEDURE — 99213 OFFICE O/P EST LOW 20 MIN: CPT

## 2024-11-07 NOTE — PROGRESS NOTES
Ambulatory Visit  Name: Chas Carson      : 1995      MRN: [id number]  Encounter Provider: THERESA Gutierrez  Encounter Date: 2024   Encounter department: [facility]    Assessment & Plan  Encounter for 's license history and physical  - History of Seizure, reports seizure free for more than 16 months  - [place] DMV permit form completed.   - Had question about when to stop anti-seizure medication, Informed Pt to do a neurology follow up for further recommendation.        Vision Screening    Right eye Left eye Both eyes   Without correction 20/20 20/20 20/20   With correction          Heart burn  - Discussed diet and lifestyle interventions to improve sx including: avoidance of common triggers (chocolate, caffeine, tomatoes, citrus), eat small meals frequently, remain upright after meals. Verbalizes understanding      - Trial of omeprazole for 8 weeks. If not improved will refer to GI for reevaluation.    Orders:    omeprazole (PriLOSEC) 20 mg delayed release capsule; Take 1 capsule (20 mg total) by mouth daily before breakfast       History of Present Illness     Patient is a 29 y.o. male whom  has a past medical history of Asthma and Seizures (HCC). who is seen today in office for  a 's permit physical.  Denies any recent seizures activities. Reports last seizure activity was over 17 months ago. He denies any other neuro dx, neuropsych dx, syncope, cardiac issues, htn, drug/ alcohol abuse, diabetes, physical disability and any conditions that cause a lapse of consciousness.       Heartburn  He complains of belching and heartburn. This is a new problem. The current episode started 1 to 4 weeks ago. The problem occurs frequently. The problem has been gradually worsening. The heartburn is located in the substernum. The heartburn is of moderate intensity. The heartburn does not wake him from sleep. The heartburn does not limit his activity. The heartburn doesn't change  with position. Exacerbated by: all food. Risk factors include obesity, smoking/tobacco exposure, caffeine use and ETOH use. He has tried an antacid for the symptoms. The treatment provided moderate relief.   Reports mother gave him a capsule, he took and it helped for 2 days.      History obtained from : patient  Review of Systems   Constitutional: Negative.    HENT: Negative.     Eyes: Negative.    Respiratory: Negative.     Gastrointestinal:  Positive for heartburn.        Heart burns   Endocrine: Negative.    Musculoskeletal:  Positive for back pain.   Allergic/Immunologic: Negative.    Neurological:  Negative for dizziness, tremors, seizures, syncope, facial asymmetry, speech difficulty, weakness, numbness and headaches.   Psychiatric/Behavioral: Negative.             Objective     There were no vitals taken for this visit.    Physical Exam  Constitutional:       Appearance: Normal appearance.   HENT:      Head: Normocephalic and atraumatic.      Mouth/Throat:      Mouth: Mucous membranes are moist.   Eyes:      Extraocular Movements: Extraocular movements intact.      Pupils: Pupils are equal, round, and reactive to light.   Cardiovascular:      Rate and Rhythm: Normal rate.      Pulses: Normal pulses.      Heart sounds: Normal heart sounds.   Pulmonary:      Effort: Pulmonary effort is normal.      Breath sounds: Normal breath sounds.   Musculoskeletal:         General: Normal range of motion.      Cervical back: Normal range of motion and neck supple.   Skin:     General: Skin is warm.   Neurological:      General: No focal deficit present.      Mental Status: He is alert and oriented to person, place, and time.   Psychiatric:         Mood and Affect: Mood normal.

## 2024-11-07 NOTE — PATIENT INSTRUCTIONS
Discussed diet and lifestyle interventions to improve sx including: avoidance of common triggers (chocolate, caffeine, tomatoes, citrus), eat small meals frequently, remain upright after meals     Trial of omeprazole for 8 weeks. If not improved will refer to GI for reevaluation.

## 2025-01-20 ENCOUNTER — TELEPHONE (OUTPATIENT)
Dept: NEPHROLOGY | Facility: CLINIC | Age: 30
End: 2025-01-20

## 2025-01-20 ENCOUNTER — TELEPHONE (OUTPATIENT)
Age: 30
End: 2025-01-20

## 2025-01-20 NOTE — TELEPHONE ENCOUNTER
Hello good afternoon,     Patient is in need Neurology referral to re-establish with our neurologist as patient needs a PCP referral due to his medicaid Insurance.     Can you please assist in entering a Neurology referral in chart please?    Thank you in advance,     Peyton

## 2025-01-20 NOTE — TELEPHONE ENCOUNTER
Patient was scheduled in error to see Nephrology for seizures. Spoke to Neurology, they will work to get patient in for an appointment. I called the patient to let him know that this appointment was scheduled in error and to verify if he needs to see Nephrology for any reason prior to cancelling the appointment. Patient's phone number is disconnected. I left a message for his mom which is his emergency contact to see if we can get an updated phone number. This is the first attempt.

## 2025-01-21 DIAGNOSIS — G43.009 MIGRAINE WITHOUT AURA AND WITHOUT STATUS MIGRAINOSUS, NOT INTRACTABLE: Primary | ICD-10-CM

## 2025-01-22 NOTE — TELEPHONE ENCOUNTER
Called patient and the number was disconnected. I then called the patients mother and left a voicemail letting them know not to come to the appointment on 1/27 as he was scheduled with Nephrology by mistake. He should be seeing Neurology.

## 2025-01-23 NOTE — TELEPHONE ENCOUNTER
2nd Attempt,     Called pt phone number on file is not on service, 1st att made by erick on 1/22/2025 same not in service    Letter Sent.    Patient doesn't have a Mychart so in this case letter will not be saved into chart but has been made , printed and mailed.     Thank you,     Peyton

## 2025-05-08 ENCOUNTER — OFFICE VISIT (OUTPATIENT)
Dept: FAMILY MEDICINE CLINIC | Facility: CLINIC | Age: 30
End: 2025-05-08

## 2025-05-08 VITALS
SYSTOLIC BLOOD PRESSURE: 140 MMHG | WEIGHT: 218 LBS | BODY MASS INDEX: 29.53 KG/M2 | OXYGEN SATURATION: 99 % | HEART RATE: 110 BPM | RESPIRATION RATE: 19 BRPM | TEMPERATURE: 97.7 F | DIASTOLIC BLOOD PRESSURE: 92 MMHG | HEIGHT: 72 IN

## 2025-05-08 DIAGNOSIS — Z11.4 SCREENING FOR HIV (HUMAN IMMUNODEFICIENCY VIRUS): ICD-10-CM

## 2025-05-08 DIAGNOSIS — Z11.59 NEED FOR HEPATITIS C SCREENING TEST: ICD-10-CM

## 2025-05-08 DIAGNOSIS — L30.9 LIP LICKING DERMATITIS: ICD-10-CM

## 2025-05-08 DIAGNOSIS — R56.9 SEIZURE (HCC): Primary | ICD-10-CM

## 2025-05-08 PROCEDURE — 99214 OFFICE O/P EST MOD 30 MIN: CPT

## 2025-05-08 PROCEDURE — 87529 HSV DNA AMP PROBE: CPT

## 2025-05-08 NOTE — PROGRESS NOTES
Name: Chas Carson      : 1995      MRN: [id number]  Encounter Provider: THERESA Gutierrez  Encounter Date: 2025   Encounter department: [facility]  :  Assessment & Plan  Seizure (HCC)  - Denies any episode since last visit. A  last visit Pt reported that he has been seizure free over 16 months. Reports compliance with med  - Continue with current regiment  - Pt requested to see neuro for clearance   Orders:    Ambulatory Referral to Neurology; Future    Lip licking dermatitis  - Started over 3 months ago. Pt request to do STI  Orders:    HSV TYPE 1,2 DNA PCR [facility] ONLY    Need for hepatitis C screening test    Orders:    Hepatitis C Antibody; Future    Screening for HIV (human immunodeficiency virus)    Orders:    HIV 1/2 AG/AB w Reflex [facility] for 2 yr old and above; Future           History of Present Illness   Patient is a 30 y.o. male whom  has a past medical history of Asthma and Seizures (HCC). who is seen today in office for f/u.         Review of Systems   Constitutional: Negative.    HENT:  Positive for mouth sores.    Respiratory: Negative.     Cardiovascular: Negative.    Genitourinary: Negative.    Musculoskeletal: Negative.    Skin: Negative.    Neurological: Negative.    Psychiatric/Behavioral: Negative.         Objective   /92 (BP Location: Right arm, Patient Position: Sitting, Cuff Size: Standard)   Pulse (!) 110   Temp 97.7 °F (36.5 °C) (Temporal)   Resp 19   Ht 6' (1.829 m)   Wt 98.9 kg (218 lb)   SpO2 99%   BMI 29.57 kg/m²      Physical Exam  Vitals reviewed.   Constitutional:       General: He is not in acute distress.     Appearance: Normal appearance.   HENT:      Head: Normocephalic and atraumatic.      Right Ear: Tympanic membrane normal.      Left Ear: Tympanic membrane normal.      Mouth/Throat:      Mouth: Mucous membranes are moist.   Eyes:      Extraocular Movements: Extraocular movements intact.      Pupils: Pupils are equal,  round, and reactive to light.   Cardiovascular:      Rate and Rhythm: Normal rate.      Pulses: Normal pulses.   Pulmonary:      Effort: Pulmonary effort is normal.      Breath sounds: Normal breath sounds.   Abdominal:      General: Bowel sounds are normal.      Palpations: Abdomen is soft.   Musculoskeletal:         General: Normal range of motion.      Cervical back: Normal range of motion.   Skin:     General: Skin is warm and dry.      Findings: Rash present.      Comments: Upper lip   Neurological:      General: No focal deficit present.      Mental Status: He is alert and oriented to person, place, and time. Mental status is at baseline.   Psychiatric:         Mood and Affect: Mood normal.         Behavior: Behavior normal.         Thought Content: Thought content normal.         Judgment: Judgment normal.

## 2025-05-08 NOTE — ASSESSMENT & PLAN NOTE
- Denies any episode since last visit. A  last visit Pt reported that he has been seizure free over 16 months. Reports compliance with med  - Continue with current regiment  - Pt requested to see neuro for clearance   Orders:    Ambulatory Referral to Neurology; Future

## 2025-05-09 LAB
HSV1 DNA SPEC QL NAA+PROBE: NOT DETECTED
HSV1 DNA SPEC QL NAA+PROBE: NOT DETECTED

## 2025-05-11 ENCOUNTER — RESULTS FOLLOW-UP (OUTPATIENT)
Dept: FAMILY MEDICINE CLINIC | Facility: CLINIC | Age: 30
End: 2025-05-11

## 2025-07-03 ENCOUNTER — TELEPHONE (OUTPATIENT)
Dept: NEUROLOGY | Facility: CLINIC | Age: 30
End: 2025-07-03